# Patient Record
Sex: MALE | Race: WHITE | Employment: FULL TIME | URBAN - METROPOLITAN AREA
[De-identification: names, ages, dates, MRNs, and addresses within clinical notes are randomized per-mention and may not be internally consistent; named-entity substitution may affect disease eponyms.]

---

## 2022-03-18 PROBLEM — Z12.5 PROSTATE CANCER SCREENING: Status: ACTIVE | Noted: 2021-07-28

## 2022-03-18 PROBLEM — E66.3 OVERWEIGHT (BMI 25.0-29.9): Status: ACTIVE | Noted: 2021-06-29

## 2022-03-18 PROBLEM — Z12.11 COLON CANCER SCREENING: Status: ACTIVE | Noted: 2021-07-28

## 2022-03-18 PROBLEM — E78.5 HYPERLIPIDEMIA: Status: ACTIVE | Noted: 2021-06-29

## 2022-03-18 PROBLEM — N41.0 ACUTE PROSTATITIS: Status: ACTIVE | Noted: 2021-11-04

## 2022-03-19 PROBLEM — R53.83 FATIGUE: Status: ACTIVE | Noted: 2021-06-29

## 2022-03-20 PROBLEM — N40.1 BENIGN PROSTATIC HYPERPLASIA WITH NOCTURIA: Status: ACTIVE | Noted: 2021-11-04

## 2022-03-20 PROBLEM — R55 NEAR SYNCOPE: Status: ACTIVE | Noted: 2021-06-29

## 2022-03-20 PROBLEM — Z95.1 HX OF CABG: Status: ACTIVE | Noted: 2021-06-29

## 2022-03-20 PROBLEM — R35.1 BENIGN PROSTATIC HYPERPLASIA WITH NOCTURIA: Status: ACTIVE | Noted: 2021-11-04

## 2022-03-20 PROBLEM — Z23 ENCOUNTER FOR IMMUNIZATION: Status: ACTIVE | Noted: 2021-07-28

## 2022-05-13 PROBLEM — I10 HYPERTENSION: Status: ACTIVE | Noted: 2022-05-13

## 2022-05-13 PROBLEM — R06.09 CHRONIC DYSPNEA: Status: ACTIVE | Noted: 2022-05-13

## 2022-06-21 ENCOUNTER — TELEPHONE (OUTPATIENT)
Dept: CARDIOLOGY CLINIC | Age: 62
End: 2022-06-21

## 2022-06-21 NOTE — TELEPHONE ENCOUNTER
Triage informed pt of Dr. Mayte Ortega response to his echocardiogram results. Pt verbalizes understanding.

## 2022-06-21 NOTE — TELEPHONE ENCOUNTER
----- Message from Osman Grajeda MD sent at 6/20/2022  5:34 PM EDT -----  Please let the patient know that the heart function is normal on ECHO.

## 2022-08-01 NOTE — PROGRESS NOTES
PLEASE CONTINUE TAKING ALL PRESCRIPTION MEDICATIONS UP TO THE DAY OF SURGERY UNLESS OTHERWISE DIRECTED BELOW. DISCONTINUE all vitamins and supplements 7 days prior to surgery. DISCONTINUE Non-Steriodal Anti-Inflammatory (NSAIDS) such as Advil and Aleve 5 days prior to surgery. Home Medications to take  the day of surgery    Asa 81 mg           Home Medications   to Hold   none        Comments       On the day before surgery please take Acetaminophen 1000mg in the morning and then again before bed. You may substitute for Tylenol 650 mg. Please do not bring home medications with you on the day of surgery unless otherwise directed by your nurse. If you are instructed to bring home medications, please give them to your nurse as they will be administered by the nursing staff. If you have any questions, please call [address] [place] [address] [phone] or  [facility] [phone]. A copy of this note was provided to the patient for reference.   7

## 2022-08-04 ENCOUNTER — ANESTHESIA (OUTPATIENT)
Dept: SURGERY | Age: 62
End: 2022-08-04
Payer: COMMERCIAL

## 2022-08-04 ENCOUNTER — HOSPITAL ENCOUNTER (OUTPATIENT)
Age: 62
Setting detail: OUTPATIENT SURGERY
Discharge: HOME | End: 2022-08-04
Attending: OPHTHALMOLOGY | Admitting: OPHTHALMOLOGY
Payer: COMMERCIAL

## 2022-08-04 ENCOUNTER — ANESTHESIA EVENT (OUTPATIENT)
Dept: SURGERY | Age: 62
End: 2022-08-04
Payer: COMMERCIAL

## 2022-08-04 ENCOUNTER — PREP FOR PROCEDURE (OUTPATIENT)
Dept: ADMINISTRATIVE | Age: 62
End: 2022-08-04

## 2022-08-04 ENCOUNTER — PREP FOR PROCEDURE (OUTPATIENT)
Dept: OPHTHALMOLOGY | Age: 62
End: 2022-08-04

## 2022-08-04 VITALS
SYSTOLIC BLOOD PRESSURE: 125 MMHG | HEART RATE: 71 BPM | DIASTOLIC BLOOD PRESSURE: 73 MMHG | HEIGHT: 67 IN | BODY MASS INDEX: 25.74 KG/M2 | TEMPERATURE: 97.1 F | WEIGHT: 164 LBS | OXYGEN SATURATION: 96 % | RESPIRATION RATE: 16 BRPM

## 2022-08-04 DIAGNOSIS — G89.18 POST-OP PAIN: Primary | ICD-10-CM

## 2022-08-04 LAB
GLUCOSE BLD STRIP.AUTO-MCNC: 123 MG/DL (ref 65–100)
SERVICE CMNT-IMP: ABNORMAL

## 2022-08-04 PROCEDURE — 3700000000 HC ANESTHESIA ATTENDED CARE: Performed by: OPHTHALMOLOGY

## 2022-08-04 PROCEDURE — 3700000001 HC ADD 15 MINUTES (ANESTHESIA): Performed by: OPHTHALMOLOGY

## 2022-08-04 PROCEDURE — 6370000000 HC RX 637 (ALT 250 FOR IP): Performed by: OPHTHALMOLOGY

## 2022-08-04 PROCEDURE — 7100000011 HC PHASE II RECOVERY - ADDTL 15 MIN: Performed by: OPHTHALMOLOGY

## 2022-08-04 PROCEDURE — 7100000010 HC PHASE II RECOVERY - FIRST 15 MIN: Performed by: OPHTHALMOLOGY

## 2022-08-04 PROCEDURE — 2500000003 HC RX 250 WO HCPCS: Performed by: NURSE ANESTHETIST, CERTIFIED REGISTERED

## 2022-08-04 PROCEDURE — 2709999900 HC NON-CHARGEABLE SUPPLY: Performed by: OPHTHALMOLOGY

## 2022-08-04 PROCEDURE — 2720000010 HC SURG SUPPLY STERILE: Performed by: OPHTHALMOLOGY

## 2022-08-04 PROCEDURE — 2500000003 HC RX 250 WO HCPCS: Performed by: OPHTHALMOLOGY

## 2022-08-04 PROCEDURE — 3600000004 HC SURGERY LEVEL 4 BASE: Performed by: OPHTHALMOLOGY

## 2022-08-04 PROCEDURE — C1784 OCULAR DEV, INTRAOP, DET RET: HCPCS | Performed by: OPHTHALMOLOGY

## 2022-08-04 PROCEDURE — 7100000001 HC PACU RECOVERY - ADDTL 15 MIN: Performed by: OPHTHALMOLOGY

## 2022-08-04 PROCEDURE — 2580000003 HC RX 258: Performed by: ANESTHESIOLOGY

## 2022-08-04 PROCEDURE — 6360000002 HC RX W HCPCS: Performed by: NURSE ANESTHETIST, CERTIFIED REGISTERED

## 2022-08-04 PROCEDURE — 6370000000 HC RX 637 (ALT 250 FOR IP): Performed by: ANESTHESIOLOGY

## 2022-08-04 PROCEDURE — 3600000014 HC SURGERY LEVEL 4 ADDTL 15MIN: Performed by: OPHTHALMOLOGY

## 2022-08-04 PROCEDURE — C1713 ANCHOR/SCREW BN/BN,TIS/BN: HCPCS | Performed by: OPHTHALMOLOGY

## 2022-08-04 PROCEDURE — 2580000003 HC RX 258: Performed by: OPHTHALMOLOGY

## 2022-08-04 PROCEDURE — 6360000002 HC RX W HCPCS: Performed by: OPHTHALMOLOGY

## 2022-08-04 PROCEDURE — 7100000000 HC PACU RECOVERY - FIRST 15 MIN: Performed by: OPHTHALMOLOGY

## 2022-08-04 PROCEDURE — 82962 GLUCOSE BLOOD TEST: CPT

## 2022-08-04 DEVICE — SLEEVE SCLER BCKL L30IN OD21IN ID1IN SIL STYL 70: Type: IMPLANTABLE DEVICE | Site: EYE | Status: FUNCTIONAL

## 2022-08-04 DEVICE — STRIP SCLER W3.5XL125MM THK0.75MM SIL SLD STYL 41/S2970: Type: IMPLANTABLE DEVICE | Site: EYE | Status: FUNCTIONAL

## 2022-08-04 RX ORDER — SODIUM CHLORIDE 0.9 % (FLUSH) 0.9 %
5-40 SYRINGE (ML) INJECTION EVERY 12 HOURS SCHEDULED
Status: DISCONTINUED | OUTPATIENT
Start: 2022-08-04 | End: 2022-08-04 | Stop reason: HOSPADM

## 2022-08-04 RX ORDER — EPHEDRINE SULFATE/0.9% NACL/PF 50 MG/5 ML
SYRINGE (ML) INTRAVENOUS PRN
Status: DISCONTINUED | OUTPATIENT
Start: 2022-08-04 | End: 2022-08-04 | Stop reason: SDUPTHER

## 2022-08-04 RX ORDER — SODIUM CHLORIDE, POTASSIUM CHLORIDE, CALCIUM CHLORIDE, MAGNESIUM CHLORIDE, SODIUM ACETATE, AND SODIUM CITRATE 6.4; .75; .48; .3; 3.9; 1.7 MG/ML; MG/ML; MG/ML; MG/ML; MG/ML; MG/ML
SOLUTION IRRIGATION PRN
Status: DISCONTINUED | OUTPATIENT
Start: 2022-08-04 | End: 2022-08-04 | Stop reason: ALTCHOICE

## 2022-08-04 RX ORDER — SODIUM CHLORIDE, SODIUM LACTATE, POTASSIUM CHLORIDE, CALCIUM CHLORIDE 600; 310; 30; 20 MG/100ML; MG/100ML; MG/100ML; MG/100ML
INJECTION, SOLUTION INTRAVENOUS CONTINUOUS
Status: DISCONTINUED | OUTPATIENT
Start: 2022-08-04 | End: 2022-08-04 | Stop reason: HOSPADM

## 2022-08-04 RX ORDER — PHENYLEPHRINE HCL 2.5 %
1 DROPS OPHTHALMIC (EYE)
Status: CANCELLED | OUTPATIENT
Start: 2022-08-04 | End: 2022-08-04

## 2022-08-04 RX ORDER — HYDROMORPHONE HYDROCHLORIDE 2 MG/ML
0.25 INJECTION, SOLUTION INTRAMUSCULAR; INTRAVENOUS; SUBCUTANEOUS EVERY 5 MIN PRN
Status: DISCONTINUED | OUTPATIENT
Start: 2022-08-04 | End: 2022-08-04 | Stop reason: HOSPADM

## 2022-08-04 RX ORDER — HYDROCODONE BITARTRATE AND ACETAMINOPHEN 7.5; 325 MG/1; MG/1
1 TABLET ORAL EVERY 6 HOURS PRN
Qty: 20 TABLET | Refills: 0 | OUTPATIENT
Start: 2022-08-04 | End: 2022-08-09

## 2022-08-04 RX ORDER — LIDOCAINE HYDROCHLORIDE 10 MG/ML
1 INJECTION, SOLUTION INFILTRATION; PERINEURAL
Status: DISCONTINUED | OUTPATIENT
Start: 2022-08-04 | End: 2022-08-04 | Stop reason: HOSPADM

## 2022-08-04 RX ORDER — BUPIVACAINE HYDROCHLORIDE 5 MG/ML
INJECTION, SOLUTION EPIDURAL; INTRACAUDAL PRN
Status: DISCONTINUED | OUTPATIENT
Start: 2022-08-04 | End: 2022-08-04 | Stop reason: ALTCHOICE

## 2022-08-04 RX ORDER — PHENYLEPHRINE HCL 2.5 %
1 DROPS OPHTHALMIC (EYE)
Status: COMPLETED | OUTPATIENT
Start: 2022-08-04 | End: 2022-08-04

## 2022-08-04 RX ORDER — CEFAZOLIN SODIUM 1 G/3ML
INJECTION, POWDER, FOR SOLUTION INTRAMUSCULAR; INTRAVENOUS PRN
Status: DISCONTINUED | OUTPATIENT
Start: 2022-08-04 | End: 2022-08-04 | Stop reason: ALTCHOICE

## 2022-08-04 RX ORDER — DEXTROSE MONOHYDRATE 100 MG/ML
INJECTION, SOLUTION INTRAVENOUS CONTINUOUS PRN
Status: COMPLETED | OUTPATIENT
Start: 2022-08-04 | End: 2022-08-04

## 2022-08-04 RX ORDER — NEOMYCIN SULFATE, POLYMYXIN B SULFATE, AND DEXAMETHASONE 3.5; 10000; 1 MG/G; [USP'U]/G; MG/G
OINTMENT OPHTHALMIC PRN
Status: DISCONTINUED | OUTPATIENT
Start: 2022-08-04 | End: 2022-08-04 | Stop reason: ALTCHOICE

## 2022-08-04 RX ORDER — PROPOFOL 10 MG/ML
INJECTION, EMULSION INTRAVENOUS PRN
Status: DISCONTINUED | OUTPATIENT
Start: 2022-08-04 | End: 2022-08-04 | Stop reason: SDUPTHER

## 2022-08-04 RX ORDER — LIDOCAINE HYDROCHLORIDE 20 MG/ML
INJECTION, SOLUTION EPIDURAL; INFILTRATION; INTRACAUDAL; PERINEURAL PRN
Status: DISCONTINUED | OUTPATIENT
Start: 2022-08-04 | End: 2022-08-04 | Stop reason: SDUPTHER

## 2022-08-04 RX ORDER — OXYCODONE HYDROCHLORIDE 5 MG/1
5 TABLET ORAL PRN
Status: DISCONTINUED | OUTPATIENT
Start: 2022-08-04 | End: 2022-08-04 | Stop reason: HOSPADM

## 2022-08-04 RX ORDER — BETAMETHASONE SODIUM PHOSPHATE AND BETAMETHASONE ACETATE 3; 3 MG/ML; MG/ML
INJECTION, SUSPENSION INTRA-ARTICULAR; INTRALESIONAL; INTRAMUSCULAR; SOFT TISSUE PRN
Status: DISCONTINUED | OUTPATIENT
Start: 2022-08-04 | End: 2022-08-04 | Stop reason: ALTCHOICE

## 2022-08-04 RX ORDER — SODIUM CHLORIDE 0.9 % (FLUSH) 0.9 %
5-40 SYRINGE (ML) INJECTION PRN
Status: DISCONTINUED | OUTPATIENT
Start: 2022-08-04 | End: 2022-08-04 | Stop reason: HOSPADM

## 2022-08-04 RX ORDER — LIDOCAINE HYDROCHLORIDE 20 MG/ML
INJECTION, SOLUTION EPIDURAL; INFILTRATION; INTRACAUDAL; PERINEURAL PRN
Status: DISCONTINUED | OUTPATIENT
Start: 2022-08-04 | End: 2022-08-04 | Stop reason: ALTCHOICE

## 2022-08-04 RX ORDER — CYCLOPENTOLATE HYDROCHLORIDE 20 MG/ML
1 SOLUTION/ DROPS OPHTHALMIC
Status: COMPLETED | OUTPATIENT
Start: 2022-08-04 | End: 2022-08-04

## 2022-08-04 RX ORDER — HYDROMORPHONE HYDROCHLORIDE 2 MG/ML
0.5 INJECTION, SOLUTION INTRAMUSCULAR; INTRAVENOUS; SUBCUTANEOUS EVERY 5 MIN PRN
Status: DISCONTINUED | OUTPATIENT
Start: 2022-08-04 | End: 2022-08-04 | Stop reason: HOSPADM

## 2022-08-04 RX ORDER — ONDANSETRON 2 MG/ML
INJECTION INTRAMUSCULAR; INTRAVENOUS PRN
Status: DISCONTINUED | OUTPATIENT
Start: 2022-08-04 | End: 2022-08-04 | Stop reason: SDUPTHER

## 2022-08-04 RX ORDER — ACETAMINOPHEN 500 MG
1000 TABLET ORAL ONCE
Status: COMPLETED | OUTPATIENT
Start: 2022-08-04 | End: 2022-08-04

## 2022-08-04 RX ORDER — SODIUM CHLORIDE 9 MG/ML
INJECTION, SOLUTION INTRAVENOUS PRN
Status: DISCONTINUED | OUTPATIENT
Start: 2022-08-04 | End: 2022-08-04 | Stop reason: HOSPADM

## 2022-08-04 RX ORDER — MIDAZOLAM HYDROCHLORIDE 2 MG/2ML
2 INJECTION, SOLUTION INTRAMUSCULAR; INTRAVENOUS
Status: DISCONTINUED | OUTPATIENT
Start: 2022-08-04 | End: 2022-08-04 | Stop reason: HOSPADM

## 2022-08-04 RX ORDER — ONDANSETRON 2 MG/ML
4 INJECTION INTRAMUSCULAR; INTRAVENOUS
Status: DISCONTINUED | OUTPATIENT
Start: 2022-08-04 | End: 2022-08-04 | Stop reason: HOSPADM

## 2022-08-04 RX ORDER — DIPHENHYDRAMINE HYDROCHLORIDE 50 MG/ML
12.5 INJECTION INTRAMUSCULAR; INTRAVENOUS
Status: DISCONTINUED | OUTPATIENT
Start: 2022-08-04 | End: 2022-08-04 | Stop reason: HOSPADM

## 2022-08-04 RX ORDER — GENTAMICIN SULFATE 3 MG/ML
SOLUTION/ DROPS OPHTHALMIC PRN
Status: DISCONTINUED | OUTPATIENT
Start: 2022-08-04 | End: 2022-08-04 | Stop reason: ALTCHOICE

## 2022-08-04 RX ORDER — DEXAMETHASONE SODIUM PHOSPHATE 10 MG/ML
INJECTION INTRAMUSCULAR; INTRAVENOUS PRN
Status: DISCONTINUED | OUTPATIENT
Start: 2022-08-04 | End: 2022-08-04 | Stop reason: SDUPTHER

## 2022-08-04 RX ORDER — OXYCODONE HYDROCHLORIDE 5 MG/1
10 TABLET ORAL PRN
Status: DISCONTINUED | OUTPATIENT
Start: 2022-08-04 | End: 2022-08-04 | Stop reason: HOSPADM

## 2022-08-04 RX ORDER — PROCHLORPERAZINE EDISYLATE 5 MG/ML
5 INJECTION INTRAMUSCULAR; INTRAVENOUS
Status: DISCONTINUED | OUTPATIENT
Start: 2022-08-04 | End: 2022-08-04 | Stop reason: HOSPADM

## 2022-08-04 RX ORDER — CYCLOPENTOLATE HYDROCHLORIDE 20 MG/ML
1 SOLUTION/ DROPS OPHTHALMIC
Status: CANCELLED | OUTPATIENT
Start: 2022-08-04 | End: 2022-08-04

## 2022-08-04 RX ADMIN — DEXAMETHASONE SODIUM PHOSPHATE 10 MG: 10 INJECTION INTRAMUSCULAR; INTRAVENOUS at 13:40

## 2022-08-04 RX ADMIN — FENTANYL CITRATE 50 MCG: 50 INJECTION INTRAMUSCULAR; INTRAVENOUS at 14:24

## 2022-08-04 RX ADMIN — Medication 15 MG: at 13:05

## 2022-08-04 RX ADMIN — Medication 10 MG: at 13:54

## 2022-08-04 RX ADMIN — CYCLOPENTOLATE HYDROCHLORIDE 1 DROP: 20 SOLUTION/ DROPS OPHTHALMIC at 12:33

## 2022-08-04 RX ADMIN — Medication 15 MG: at 13:27

## 2022-08-04 RX ADMIN — LIDOCAINE HYDROCHLORIDE 100 MG: 20 INJECTION, SOLUTION EPIDURAL; INFILTRATION; INTRACAUDAL; PERINEURAL at 13:05

## 2022-08-04 RX ADMIN — ONDANSETRON 4 MG: 2 INJECTION INTRAMUSCULAR; INTRAVENOUS at 13:41

## 2022-08-04 RX ADMIN — FENTANYL CITRATE 50 MCG: 50 INJECTION INTRAMUSCULAR; INTRAVENOUS at 13:16

## 2022-08-04 RX ADMIN — CYCLOPENTOLATE HYDROCHLORIDE 1 DROP: 20 SOLUTION/ DROPS OPHTHALMIC at 12:21

## 2022-08-04 RX ADMIN — SODIUM CHLORIDE, POTASSIUM CHLORIDE, SODIUM LACTATE AND CALCIUM CHLORIDE: 600; 310; 30; 20 INJECTION, SOLUTION INTRAVENOUS at 12:35

## 2022-08-04 RX ADMIN — PHENYLEPHRINE HYDROCHLORIDE 1 DROP: 25 SOLUTION/ DROPS OPHTHALMIC at 12:22

## 2022-08-04 RX ADMIN — PHENYLEPHRINE HYDROCHLORIDE 1 DROP: 25 SOLUTION/ DROPS OPHTHALMIC at 12:33

## 2022-08-04 RX ADMIN — CYCLOPENTOLATE HYDROCHLORIDE 1 DROP: 20 SOLUTION/ DROPS OPHTHALMIC at 12:28

## 2022-08-04 RX ADMIN — ACETAMINOPHEN 1000 MG: 500 TABLET, FILM COATED ORAL at 12:28

## 2022-08-04 RX ADMIN — PHENYLEPHRINE HYDROCHLORIDE 1 DROP: 25 SOLUTION/ DROPS OPHTHALMIC at 12:28

## 2022-08-04 RX ADMIN — PROPOFOL 200 MG: 10 INJECTION, EMULSION INTRAVENOUS at 13:05

## 2022-08-04 ASSESSMENT — PAIN - FUNCTIONAL ASSESSMENT: PAIN_FUNCTIONAL_ASSESSMENT: 0-10

## 2022-08-04 NOTE — DISCHARGE INSTRUCTIONS
MD Ni Schneider MD Forest Murray. MD Emma Morgan. Óscar Barr MD    [phone] or [phone] or [phone] or [phone]    Post Operative Instructions for Retina and Vitreous Surgery Patients    The following are a few guidelines that you should observe for two weeks after surgery:    1. Avoid stooping over, lifting heavy weights or bumping your head. 2.  Special positioning:EITHER SIDE LOOKING DOWN     3. No extensive traveling except what is necessary. If a gas air bubble was put in your      eye at surgery - avoid air travel until you consult your doctor. 4.  You may watch television, read, cook and wash dishes as long as it does not interfere        with special positioning instructions. 5.  No strenuous activity or sexual intercourse. 6.  Some discharge from the operated eye is to be expected. This should gradually        improve. 7.  Wear the eye shield or glasses at all times until cleared by the doctor. 8.  If you experience increasing pain, decreasing vision, or pus like discharge, call the      Office. 9. BRING ALL EYE DROPS TO YOU POSTOPERATIVE APPOINTMENT! 10. Return appointment at the [facility]        On 8/5/22 @ 9:25am    Medication Interaction:  During your procedure you potentially received a medication or medications which may reduce the effectiveness of oral contraceptives. Please consider other forms of contraception for 1 month following your procedure if you are currently using oral contraceptives as your primary form of birth control. In addition to this, we recommend continuing your oral contraceptive as prescribed, unless otherwise instructed by your physician, during this time.     After general anesthesia or intravenous sedation, for 24 hours or while taking prescription Narcotics:  Limit your activities  A responsible adult needs to be with you for the next 24 hours  Do not drive and operate hazardous machinery  Do not make important personal or business decisions  Do not drink alcoholic beverages  If you have not urinated within 8 hours after discharge, and you are experiencing discomfort from urinary retention, please go to the nearest ED. If you have sleep apnea and have a CPAP machine, please use it for all naps and sleeping. Please use caution when taking narcotics and any of your home medications that may cause drowsiness. *  Please give a list of your current medications to your Primary Care Provider. *  Please update this list whenever your medications are discontinued, doses are      changed, or new medications (including over-the-counter products) are added. *  Please carry medication information at all times in case of emergency situations. These are general instructions for a healthy lifestyle:  No smoking/ No tobacco products/ Avoid exposure to second hand smoke  Surgeon General's Warning:  Quitting smoking now greatly reduces serious risk to your health. Obesity, smoking, and sedentary lifestyle greatly increases your risk for illness  A healthy diet, regular physical exercise & weight monitoring are important for maintaining a healthy lifestyle    You may be retaining fluid if you have a history of heart failure or if you experience any of the following symptoms:  Weight gain of 3 pounds or more overnight or 5 pounds in a week, increased swelling in our hands or feet or shortness of breath while lying flat in bed. Please call your doctor as soon as you notice any of these symptoms; do not wait until your next office visit.

## 2022-08-04 NOTE — NURSE NAVIGATOR
Pt received from OR on NC. Tolerating RA> wife at bedside. discharge instructions reviewed and questions answered. Vss. No further needs at this time.

## 2022-08-04 NOTE — ANESTHESIA PROCEDURE NOTES
Airway  Date/Time: 8/4/2022 1:07 PM  Urgency: elective    Airway not difficult    General Information and Staff    Patient location during procedure: OR  Resident/CRNA: DIAN Warren - CRNA  Performed: resident/CRNA     Indications and Patient Condition  Indications for airway management: anesthesia  Spontaneous ventilation: present  Sedation level: deep  Preoxygenated: yes  Patient position: sniffing  MILS not maintained throughout  Mask difficulty assessment: vent by bag mask    Final Airway Details  Final airway type: supraglottic airway      Successful airway: oropharyngeal  Size 5     Number of attempts at approach: 1  Ventilation between attempts: supraglottic airway  Number of other approaches attempted: 0    Additional Comments  #5 LMA with leak noted at 20 cmH2O. BBS=/ETCO2+/dentition and lips as preop.   LMA inserted by Latisha WEST  no

## 2022-08-04 NOTE — ANESTHESIA PRE PROCEDURE
Department of Anesthesiology  Preprocedure Note       Name:  Pedro Servin   Age:  59 y.o.  :  1960                                          MRN:  [id number]         Date:  2022      Surgeon: Mariya Marroquin):  Dinesh Rivera MD    Procedure: Procedure(s):  RIGHT EYE VITRECTOMY 25 GAUGE LASER, SCAR TISSUE REMOVAL POSSIBLE GAS FLUID EXCHANGE VS SILICON OIL PLACEMENT WITH SCLERAL  BUCKEL PLACEMENT  PROPHYLAXIS LASER OF LEFT EYE    Medications prior to admission:   Prior to Admission medications    Medication Sig Start Date End Date Taking?  Authorizing Provider   aspirin 81 MG EC tablet Take by mouth daily    Ar Automatic Reconciliation   atorvastatin (LIPITOR) 80 MG tablet Take 80 mg by mouth at bedtime    Ar Automatic Reconciliation   empagliflozin (JARDIANCE) 10 MG tablet Take by mouth daily    Ar Automatic Reconciliation       Current medications:    Current Facility-Administered Medications   Medication Dose Route Frequency Provider Last Rate Last Admin    lidocaine 1 % injection 1 mL  1 mL IntraDERmal Once PRN Elliot Mitchell IV, MD        lactated ringers infusion   IntraVENous Continuous Elliot Mitchell IV,  mL/hr at 22 1235 Restarted at 22 1258    sodium chloride flush 0.9 % injection 5-40 mL  5-40 mL IntraVENous 2 times per day Elliot Mitchell IV, MD        sodium chloride flush 0.9 % injection 5-40 mL  5-40 mL IntraVENous PRN Elliot Mitchell IV, MD        0.9 % sodium chloride infusion   IntraVENous PRN Elliot Mitchell IV, MD        midazolam PF (VERSED) injection 2 mg  2 mg IntraVENous Once PRN Elliot Mitchell IV, MD        balanced salts (BSS) ophthalmic solution    PRN Dinesh Rivera MD   15 mL at 22 1338    betamethasone acetate-betamethasone sodium phosphate (CELESTONE) injection    PRN Dinesh Rivera MD   3 mg at 22 1338    bupivacaine (PF) (MARCAINE) 0.5 % injection    PRN Dinesh Rivera MD   3 mL at 22 1338    ceFAZolin (ANCEF) injection    PRN Sam Melton Bryan Ackerman MD   50 mg at 08/04/22 1339    dextrose 10 % infusion    Continuous PRN Doris Hoffman MD   15 mL at 08/04/22 1339    gentamicin (GARAMYCIN) 0.3 % ophthalmic solution    PRN Doris Hoffman MD   2 drop at 08/04/22 1339    hyaluronidase human (HYLENEX) injection    PRN Doris Hoffman MD   0.5 mL at 08/04/22 1340    lidocaine PF 2 % injection    PRN Doris Hoffman MD   3 mL at 08/04/22 1341    neomycin-polymyxin-dexameth ophthalmic ointment    PRN Doris Hoffman MD   1,000 mg at 08/04/22 1341    brilliant blue g (TISSUEBLUE) 0.025 % intra-ocular injection    PRN Doris Hoffman MD   0.5 mL at 08/04/22 1355     Facility-Administered Medications Ordered in Other Encounters   Medication Dose Route Frequency Provider Last Rate Last Admin    fentaNYL (SUBLIMAZE) injection   IntraVENous PRN Belen Hu, APRN - CRNA   50 mcg at 08/04/22 1316    propofol injection   IntraVENous PRN Belen Hu, APRN - CRNA   200 mg at 08/04/22 1305    lidocaine PF 2 % injection   IntraVENous PRN Belen Hu, APRN - CRNA   100 mg at 08/04/22 1305    ePHEDrine injection   IntraVENous PRN Belen Hu, APRN - CRNA   10 mg at 08/04/22 1354    dexamethasone (DECADRON) injection   IntraVENous PRN Belen Hu, APRN - CRNA   10 mg at 08/04/22 1340    ondansetron (ZOFRAN) injection   IntraVENous PRN Belen Hu, APRN - CRNA   4 mg at 08/04/22 1341       Allergies:  No Known Allergies    Problem List:    Patient Active Problem List   Diagnosis Code    Acute prostatitis N41.0    Hyperlipidemia E78.5    Osteoarthritis M19.90    Colon cancer screening Z12.11    Overweight (BMI 25.0-29. 9) E66.3    Prostate cancer screening Z12.5    Fatigue R53.83    Diabetes mellitus, type 2 (HCC) E11.9    Hyperlipidemia associated with type 2 diabetes mellitus ([facility] E11.69, E78.5    Encounter for immunization Z23    Coronary artery disease I25.10    Near syncope R55    Hx of CABG Z95.1    Benign prostatic hyperplasia with nocturia N40.1, R35.1    Chronic dyspnea R06.09    Hypertension I10       Past Medical History:        Diagnosis Date    Coronary artery disease     S/P 3 v CABG in 2019.  --NO STENTS-- followed by dr Joya Chao [place] Diabetes mellitus, type 2 ([facility]     type2-- sqbs am avg 125--- hypo at 66's    Hyperlipidemia associated with type 2 diabetes mellitus ([facility]     Osteoarthritis        Past Surgical History:        Procedure Laterality Date    APPENDECTOMY      CORONARY ARTERY BYPASS GRAFT  2019    HERNIA REPAIR Left     inguinal       Social History:    Social History     Tobacco Use    Smoking status: Never    Smokeless tobacco: Never   Substance Use Topics    Alcohol use: Yes     Comment: occ                                Counseling given: Not Answered      Vital Signs (Current):   Vitals:    08/01/22 1531 08/04/22 1232   BP:  132/70   Pulse:  66   Resp:  18   Temp:  98.4 °F (36.9 °C)   TempSrc:  Oral   SpO2:  96%   Weight: 164 lb (74.4 kg) 164 lb (74.4 kg)   Height: 5' 7\" (1.702 m)                                               BP Readings from Last 3 Encounters:   08/04/22 132/70   05/13/22 130/84   05/06/22 120/76       NPO Status: Time of last liquid consumption: 2300                        Time of last solid consumption: 2300                        Date of last liquid consumption: 08/03/22                        Date of last solid food consumption: 08/03/22    BMI:   Wt Readings from Last 3 Encounters:   08/04/22 164 lb (74.4 kg)   06/20/22 165 lb (74.8 kg)   05/13/22 165 lb (74.8 kg)     Body mass index is 25.69 kg/m².     CBC:   Lab Results   Component Value Date/Time    WBC 5.3 07/28/2021 02:30 PM    RBC 4.58 07/28/2021 02:30 PM    HGB 14.7 07/28/2021 02:30 PM    HCT 44.3 07/28/2021 02:30 PM    MCV 97 07/28/2021 02:30 PM    RDW 13.0 07/28/2021 02:30 PM     07/28/2021 02:30 PM       CMP:   Lab Results   Component Value Date/Time     11/05/2021 09:17 AM    K 5.1 11/05/2021 09:17 AM    CL 97 11/05/2021 09:17 AM    CO2 20 11/05/2021 09:17 AM    BUN 23 11/05/2021 09:17 AM    CREATININE 1.25 11/05/2021 09:17 AM    GFRAA 70 11/05/2021 09:17 AM    AGRATIO 1.8 11/05/2021 09:17 AM    GLUCOSE 109 11/05/2021 09:17 AM    PROT 6.6 11/05/2021 09:17 AM    CALCIUM 9.2 11/05/2021 09:17 AM    BILITOT 0.6 11/05/2021 09:17 AM    ALKPHOS 95 11/05/2021 09:17 AM    AST 21 11/05/2021 09:17 AM    ALT 19 11/05/2021 09:17 AM       POC Tests:   Recent Labs     08/04/22  1229   POCGLU 123*       Coags: No results found for: PROTIME, INR, APTT    HCG (If Applicable): No results found for: PREGTESTUR, PREGSERUM, HCG, HCGQUANT     ABGs: No results found for: PHART, PO2ART, NYK7KRC, UTE4YSW, BEART, Q2KSJFUZ     Type & Screen (If Applicable):  No results found for: LABABO, LABRH    Drug/Infectious Status (If Applicable):  No results found for: HIV, HEPCAB    COVID-19 Screening (If Applicable): No results found for: COVID19        Anesthesia Evaluation  Patient summary reviewed and Nursing notes reviewed  Airway: Mallampati: I  TM distance: >3 FB   Neck ROM: full  Mouth opening: > = 3 FB   Dental: normal exam         Pulmonary: breath sounds clear to auscultation                             Cardiovascular:  Exercise tolerance: good (>4 METS),   (+) hypertension:, CAD:, CABG/stent:,         Rhythm: regular  Rate: normal                    Neuro/Psych:   Negative Neuro/Psych ROS              GI/Hepatic/Renal:             Endo/Other:    (+) DiabetesType II DM, well controlled, , .                 Abdominal:             Vascular: negative vascular ROS. Other Findings:           Anesthesia Plan      general     ASA 3       Induction: intravenous. Anesthetic plan and risks discussed with patient and spouse.                         Candelaria Hylton MD   8/4/2022

## 2022-08-04 NOTE — BRIEF OP NOTE
Brief Postoperative Note      Patient: Jarvis Cronin  YOB: 1960  MRN: [id number]    Date of Procedure: 8/4/2022    Pre-Op Diagnosis: Total retinal detachment of right eye [H33.051]    Post-Op Diagnosis: Same       Procedure(s):  RIGHT EYE VITRECTOMY 25 GAUGE LASER, SCAR TISSUE REMOVAL POSSIBLE GAS FLUID EXCHANGE VS SILICON OIL PLACEMENT WITH SCLERAL  BUCKEL PLACEMENT  PROPHYLAXIS LASER OF LEFT EYE    Surgeon(s):  Bhavya Blount MD    Assistant:  * No surgical staff found *    Anesthesia: General    Estimated Blood Loss (mL): Minimal    Complications: None    Specimens:   * No specimens in log *    Implants:  Implant Name Type Inv.  Item Serial No.  Lot No. LRB No. Used Action   STRIP SCLER [id number] THK0.75MM [employer] SLD STYL 41/ - [id number]  STRIP SCLER [id number] THK0.75MM [employer] SLD STYL 41/  [facility] [employer] [employer] [id number] Right 1 Implanted   SLEEVE SCLER BCKL L30IN OD21IN ID1IN [employer]MER STYL 70 - [id number]  SLEEVE SCLER BCKL L30IN OD21IN ID1IN [employer]YL [id number] [place] [employer] [id number] Right 1 Implanted         Drains: * No LDAs found *    Findings: 0    Electronically signed by Trish Cazares MD on 8/4/2022 at 3:06 PM

## 2022-08-05 NOTE — OP NOTE
[address] [place] [address]  OPERATIVE REPORT    Name:  Javier Watson  MR#:  [id number]  :  1960  ACCOUNT #:  [de-identified]  DATE OF SERVICE:  2022    PREOPERATIVE DIAGNOSES:  1. Rhegmatogenous retinal detachment, macula-off, chronic with proliferative vitreoretinopathy, right eye. Multiple breaks. 2.  Nuclear sclerosis, right eye. 3.  Lattice degeneration, left eye. POSTOPERATIVE DIAGNOSES:  1. Rhegmatogenous retinal detachment, macula-off, chronic with proliferative vitreoretinopathy, right eye. Multiple breaks. 2.  Nuclear sclerosis, right eye. 3.  Lattice degeneration, left eye. PROCEDURE PERFORMED:  1.  Pars plana vitrectomy with repair of complex retinal detachment utilizing vitrectomy, scar tissue removal oblique, scleral buckle with 41 band and 70 sleeve. and fluid-gas exchange to the right eye. 2.  Laser prophylaxis to the left eye. SURGEON:  Patricio Briggs MD    ASSISTANT:  No assistants. ANESTHESIA:  Modified. COMPLICATIONS:  No complications. SPECIMENS REMOVED:  No specimens. IMPLANTS:  He had a 41 band with 70 sleeve. ESTIMATED BLOOD LOSS:  No blood loss. INDICATIONS FOR PROCEDURE:  The patient has significant severe vision loss for approximately one month and had previous retinal tears with multiple breaks, two large superonasal breaks and a smaller inferotemporal break with rolled edges and fairly stiff-appearing corrugated retina. He had lattice degeneration present in his fellow eye. After discussing his options, risks, and benefits, it was recommended to undergo vitrectomy with scleral buckling and possible silicone oil or other long-term tamponade in the right eye combined with laser prophylaxis in the left eye. The patient understood the risks, benefits, and alternatives to the surgery, especially the risk of recurrent detachment with PVR already present and agreed to proceed.     PROCEDURE:  After adequate preoperative evaluation and informed consent had been obtained, the patient was brought into the operative suite. IV access and general laryngotracheal mask anesthetic was achieved. The left eye was inspected with indirect ophthalmoscopy and areas of lattice degeneration were noted and treated with indirect photocoagulation, and a total of 189 spots were used. Attention was then turned to the right eye. The left eye was protected with ointment and a shield, and the right eye was inspected and found to have total retinal detachments 0 two large superonasal tears and a small inferotemporal tear and a fairly corrugated appearance. A retrobulbar injection using a 50:50 mixture of Marcaine and Xylocaine was given for pain control. No complications were noted with this. The eye was then prepped and draped in usual fashion for vitreoretinal surgery. A lid speculum was placed over the eye. The conjunctiva was opened for 360 degrees, and all muscles were sewn with 2-0 silk sutures. A 41 band was selected and placed around the eye in the mid to posterior location, joined superonasally with a 70 sleeve. This was not pulled tight. At this time, the eye was modestly hypotonous and so I did go ahead and pulled it up just a little bit to aid in peripheral vitrectomy. The 41 band was fixed in all quadrants with 5-0 Mersilene sutures, joined superonasally. Three separate 25-gauge cannulas were then placed, infusion was verified, and turned to a pressure of 35 mmHg. Through these, central vitreous was removed. There was a trace vitreous hemorrhage inferiorly. The vitreous skirt was trimmed for 360 degrees with thorough inspection. Attention was turned posteriorly. There were some fixed folds present in the macula and these were elevated with ILM forceps, stripped over the central macula 00. Good ILM removal was seen on the posterior pole and I did go ahead and placed dye staining the ILM posteriorly to allow stripping of the entire posterior pole. Several small fixed folds were peeled peripherally, but the retina seemed to be relatively mobile at this time. Consideration was given to PFO, but it was not felt there was any significant additional peelable epiretinal membrane. Therefore, a single retinotomy was created quite superiorly which verified no additional breaks or tears. Fluid-air exchange was then performed and the retina was reattached nicely on the band. Good drainage of subretinal fluid was achieved. The laser was then placed in the periphery for approximately 791 spots. Good laser uptake at this time was felt to be achieved including around the lager tear superonasally as well as inferotemporal break. Buckle was then pulled to an appropriate height. This felt like it supported these breaks superiorly nicely, and the one final drainage was performed under microscopic vitrectomy. There was still slight corrugation posteriorly, but I could not see any significant loculated subretinal fluid. Good laser uptake was in place and it was felt this was an excellent result. The conjunctiva was pulled anteriorly. A 40 mL exchange of 15% C3F8 was carried out. All sclerotomy sites were closed with 6-0 plain. The eye was left to a pressure of 17 mmHg to 21 mmHg with Barraquer tonometry. The conjunctiva was closed with 6-0 plain and subconjunctival Celestone and Ancef were irrigated in all quadrants. The eye was patched and shielded with Maxitrol ointment as well as brimonidine drops. He was taken to the recovery room after awakened from general anesthesia in good condition.       MD SAMI Burch/[id number]  D:  08/04/2022 15:00  T:  08/05/2022 2:13  JOB #:  [id number]

## 2022-08-05 NOTE — ANESTHESIA POSTPROCEDURE EVALUATION
Department of Anesthesiology  Postprocedure Note    Patient: Dustin Aburto  MRN: [id number]  YOB: 1960  Date of evaluation: 8/5/2022      Procedure Summary     Date: 08/04/22 Room / Location: [facility] OP OR 08 / [facility]    Anesthesia Start: 1258 Anesthesia Stop: 1458    Procedures:       RIGHT EYE VITRECTOMY 25 GAUGE LASER, SCAR TISSUE REMOVAL POSSIBLE GAS FLUID EXCHANGE VS SILICON OIL PLACEMENT WITH SCLERAL  BUCKEL PLACEMENT (Right: Eye)      PROPHYLAXIS LASER OF LEFT EYE (Left: Eye) Diagnosis:       Total retinal detachment of right eye      Lattice degeneration of retina, left      (Total retinal detachment of right eye [H33.051])    Surgeons: Catie Burton MD Responsible Provider: Renée Funes MD    Anesthesia Type: General ASA Status: 3          Anesthesia Type: General    Yamilka Phase I: Yamilka Score: 10    Yamilka Phase II: Yamilka Score: 10      Anesthesia Post Evaluation    Patient location during evaluation: PACU  Patient participation: complete - patient participated  Level of consciousness: awake and alert  Airway patency: patent  Nausea & Vomiting: no nausea  Complications: no  Cardiovascular status: blood pressure returned to baseline  Respiratory status: acceptable  Hydration status: euvolemic  Multimodal analgesia pain management approach

## 2022-08-16 ENCOUNTER — TELEPHONE (OUTPATIENT)
Dept: INTERNAL MEDICINE CLINIC | Facility: CLINIC | Age: 62
End: 2022-08-16

## 2022-08-16 DIAGNOSIS — E11.9 TYPE 2 DIABETES MELLITUS WITHOUT COMPLICATION, WITHOUT LONG-TERM CURRENT USE OF INSULIN (HCC): Primary | ICD-10-CM

## 2022-08-16 RX ORDER — GLUCOSAMINE HCL/CHONDROITIN SU 500-400 MG
CAPSULE ORAL
Qty: 150 STRIP | Refills: 3 | Status: SHIPPED | OUTPATIENT
Start: 2022-08-16

## 2022-08-16 NOTE — TELEPHONE ENCOUNTER
Pt requests new Rx for OneTouch Ultra test strips (using OneTouch Ultra II meter) It is not in MyChart or on file at [facility]

## 2022-11-13 NOTE — PROGRESS NOTES
[facility] CARDIOLOGY Follow Up                 Reason for Visit: Stable ischemic heart disease    Subjective:     Patient is a 57 y.o. male with a PMH of CAD status post CABG, hyperlipidemia, hypertension, and diabetes who presents for follow-up. The patient was last seen in May 2022. A TTE was ordered. The patient had a TTE in June 2022 that was noted to demonstrate a normal EF. The patient denies angina and dyspnea. Past Medical History:   Diagnosis Date    Coronary artery disease     S/P 3 v CABG in 2019.  --NO STENTS-- followed by dr quintanilla    Diabetes mellitus, type 2 ([facility] [address]     type2-- sqbs am avg 125--- hypo at 70's    Hyperlipidemia associated with type 2 diabetes mellitus ([facility] [address]     Osteoarthritis       Past Surgical History:   Procedure Laterality Date    APPENDECTOMY      CORONARY ARTERY BYPASS GRAFT  2019    HERNIA REPAIR Left     inguinal    REFRACTIVE SURGERY Left 8/4/2022    PROPHYLAXIS LASER OF LEFT EYE performed by Pam Chacon MD at [address] [place] [address]    VITRECTOMY Right 8/4/2022    RIGHT EYE VITRECTOMY 25 GAUGE LASER, SCAR TISSUE REMOVAL POSSIBLE GAS FLUID EXCHANGE VS SILICON OIL PLACEMENT WITH SCLERAL  BUCKEL PLACEMENT performed by Pam Chacon MD at [address] [place] [address]      Family History   Problem Relation Age of Onset    Atrial Fibrillation Mother     Heart Disease Father       Social History     Tobacco Use    Smoking status: Never    Smokeless tobacco: Never   Substance Use Topics    Alcohol use: Yes     Comment: occ      No Known Allergies      ROS:  No obvious pertinent positives on review of systems except for what was outlined above.        Objective:       /74   Pulse 56   Ht 5' 7\" (1.702 m)   Wt 168 lb 12.8 oz (76.6 kg)   BMI 26.44 kg/m²     BP Readings from Last 3 Encounters:   11/15/22 106/74   08/04/22 125/73   05/13/22 130/84       Wt Readings from Last 3 Encounters:   11/15/22 168 lb 12.8 oz (76.6 kg)   08/04/22 164 lb (74.4 kg)   06/20/22 165 lb (74.8 kg)       General/Constitutional: Alert and oriented x 3, no acute distress  HEENT:   normocephalic, atraumatic, moist mucous membranes  Neck:   No JVD or carotid bruits bilaterally  Cardiovascular:   regular rate and rhythm, no rub/gallop appreciated  Pulmonary:   clear to auscultation bilaterally, no respiratory distress  Abdomen:   soft, non-tender, non-distended  Ext:   No sig LE edema bilaterally  Skin:  warm and dry, no obvious rashes seen  Neuro:   no obvious sensory or motor deficits  Psychiatric:   normal mood and affect    Data Review:   Lab Results   Component Value Date    CHOL 149 11/05/2021    CHOL 206 (H) 07/28/2021     Lab Results   Component Value Date    TRIG 79 11/05/2021    TRIG 67 07/28/2021     Lab Results   Component Value Date    HDL 72 11/05/2021    HDL 65 07/28/2021     Lab Results   Component Value Date    LDLCALC 62 11/05/2021    LDLCALC 129 (H) 07/28/2021     Lab Results   Component Value Date    VLDL 15 11/05/2021    VLDL 12 07/28/2021     No results found for: [facility]     Lab Results   Component Value Date/Time     11/05/2021 09:17 AM     07/28/2021 02:30 PM    K 5.1 11/05/2021 09:17 AM    K 4.5 07/28/2021 02:30 PM    CL 97 11/05/2021 09:17 AM     07/28/2021 02:30 PM    CO2 20 11/05/2021 09:17 AM    CO2 22 07/28/2021 02:30 PM    BUN 23 11/05/2021 09:17 AM    BUN 19 07/28/2021 02:30 PM    CREATININE 1.25 11/05/2021 09:17 AM    CREATININE 1.15 07/28/2021 02:30 PM    GLUCOSE 109 11/05/2021 09:17 AM    GLUCOSE 94 07/28/2021 02:30 PM    CALCIUM 9.2 11/05/2021 09:17 AM    CALCIUM 9.0 07/28/2021 02:30 PM         Lab Results   Component Value Date    ALT 19 11/05/2021    ALT 34 07/28/2021    AST 21 11/05/2021    AST 34 07/28/2021        Assessment/Plan:   1. Hx of CABG  - Continue with baby aspirin daily and Lipitor    2.  Hyperlipidemia, unspecified hyperlipidemia type  - Continue with Lipitor    F/U: 6 months    Shaina Dhillon MD

## 2022-11-15 ENCOUNTER — OFFICE VISIT (OUTPATIENT)
Dept: CARDIOLOGY CLINIC | Age: 62
End: 2022-11-15
Payer: COMMERCIAL

## 2022-11-15 VITALS
DIASTOLIC BLOOD PRESSURE: 74 MMHG | BODY MASS INDEX: 26.49 KG/M2 | HEIGHT: 67 IN | SYSTOLIC BLOOD PRESSURE: 106 MMHG | HEART RATE: 56 BPM | WEIGHT: 168.8 LBS

## 2022-11-15 DIAGNOSIS — Z95.1 HX OF CABG: Primary | ICD-10-CM

## 2022-11-15 DIAGNOSIS — E78.5 HYPERLIPIDEMIA, UNSPECIFIED HYPERLIPIDEMIA TYPE: ICD-10-CM

## 2022-11-15 PROCEDURE — 99213 OFFICE O/P EST LOW 20 MIN: CPT | Performed by: INTERNAL MEDICINE

## 2022-11-15 PROCEDURE — 3074F SYST BP LT 130 MM HG: CPT | Performed by: INTERNAL MEDICINE

## 2022-11-15 PROCEDURE — 3078F DIAST BP <80 MM HG: CPT | Performed by: INTERNAL MEDICINE

## 2022-11-17 NOTE — TELEPHONE ENCOUNTER
Requested Prescriptions     Pending Prescriptions Disp Refills    empagliflozin (JARDIANCE) 10 MG tablet 90 tablet 1     Sig: Take 1 tablet by mouth daily [Patient Intake Form Reviewed] : Patient intake form was reviewed [Negative] : Heme/Lymph

## 2023-05-15 NOTE — PROGRESS NOTES
[facility] CARDIOLOGY Follow Up                 Reason for Visit: Stable ischemic heart disease    Subjective:     Patient is a 57 y.o. male with a PMH of CAD status post CABG, hyperlipidemia, hypertension, and diabetes who presents for follow-up. The patient was last seen in November 2022. He had a TTE in June 2022 that was noted to demonstrate a normal EF. The patient denies chest pain and dyspnea. He reports RYLEE with Lipitor. Therefore, he discontinued the medication himself. Past Medical History:   Diagnosis Date    Coronary artery disease     S/P 3 v CABG in 2019.  --NO STENTS-- followed by dr quintanilla    Diabetes mellitus, type 2 ([facility] [address]     type2-- sqbs am avg 125--- hypo at 70's    Hyperlipidemia associated with type 2 diabetes mellitus ([facility] [address]     Osteoarthritis       Past Surgical History:   Procedure Laterality Date    APPENDECTOMY      CORONARY ARTERY BYPASS GRAFT  2019    HERNIA REPAIR Left     inguinal    REFRACTIVE SURGERY Left 8/4/2022    PROPHYLAXIS LASER OF LEFT EYE performed by Agustín Valdez MD at [address] [place] [address]    VITRECTOMY Right 8/4/2022    RIGHT EYE VITRECTOMY 25 GAUGE LASER, SCAR TISSUE REMOVAL POSSIBLE GAS FLUID EXCHANGE VS SILICON OIL PLACEMENT WITH SCLERAL  BUCKEL PLACEMENT performed by Agustín Valdez MD at [address] [place] [address]      Family History   Problem Relation Age of Onset    Atrial Fibrillation Mother     Heart Disease Father       Social History     Tobacco Use    Smoking status: Never    Smokeless tobacco: Never   Substance Use Topics    Alcohol use: Yes     Comment: occ      No Known Allergies      ROS:  No obvious pertinent positives on review of systems except for what was outlined above.        Objective:       /66   Pulse 60   Ht 5' 7\" (1.702 m)   Wt 167 lb 12.8 oz (76.1 kg)   BMI 26.28 kg/m²     BP Readings from Last 3 Encounters:   05/16/23 106/66   11/15/22 106/74   08/04/22 125/73       Wt Readings from Last 3 Encounters:   05/16/23 167 lb 12.8 oz (76.1 kg)   11/15/22 168 lb 12.8

## 2023-05-16 ENCOUNTER — OFFICE VISIT (OUTPATIENT)
Age: 63
End: 2023-05-16
Payer: COMMERCIAL

## 2023-05-16 VITALS
SYSTOLIC BLOOD PRESSURE: 106 MMHG | BODY MASS INDEX: 26.34 KG/M2 | WEIGHT: 167.8 LBS | DIASTOLIC BLOOD PRESSURE: 66 MMHG | HEART RATE: 60 BPM | HEIGHT: 67 IN

## 2023-05-16 DIAGNOSIS — E78.5 HYPERLIPIDEMIA, UNSPECIFIED HYPERLIPIDEMIA TYPE: ICD-10-CM

## 2023-05-16 DIAGNOSIS — Z95.1 HX OF CABG: Primary | ICD-10-CM

## 2023-05-16 PROCEDURE — 3078F DIAST BP <80 MM HG: CPT | Performed by: INTERNAL MEDICINE

## 2023-05-16 PROCEDURE — 3074F SYST BP LT 130 MM HG: CPT | Performed by: INTERNAL MEDICINE

## 2023-05-16 PROCEDURE — 99213 OFFICE O/P EST LOW 20 MIN: CPT | Performed by: INTERNAL MEDICINE

## 2023-05-16 RX ORDER — ROSUVASTATIN CALCIUM 20 MG/1
20 TABLET, COATED ORAL NIGHTLY
Qty: 90 TABLET | Refills: 3 | Status: SHIPPED | OUTPATIENT
Start: 2023-05-16

## 2023-09-11 RX ORDER — EMPAGLIFLOZIN 10 MG/1
10 TABLET, FILM COATED ORAL DAILY
Qty: 90 TABLET | Refills: 1 | OUTPATIENT
Start: 2023-09-11

## 2023-09-14 NOTE — TELEPHONE ENCOUNTER
Requesting refills of Jardiance, last seen on 07/28/2021, will be out of town this weekend and aware that he needs to schedule follow up visit. [employer] message also sent to patient to schedule an appointment.      Requested Prescriptions     Pending Prescriptions Disp Refills    empagliflozin (JARDIANCE) 10 MG tablet 30 tablet 0     Sig: Take 1 tablet by mouth daily     30 day supply only to [facility]

## 2023-09-20 ASSESSMENT — PATIENT HEALTH QUESTIONNAIRE - PHQ9
SUM OF ALL RESPONSES TO PHQ QUESTIONS 1-9: 0
SUM OF ALL RESPONSES TO PHQ QUESTIONS 1-9: 0
1. LITTLE INTEREST OR PLEASURE IN DOING THINGS: 0
2. FEELING DOWN, DEPRESSED OR HOPELESS: NOT AT ALL
SUM OF ALL RESPONSES TO PHQ QUESTIONS 1-9: 0
2. FEELING DOWN, DEPRESSED OR HOPELESS: 0
SUM OF ALL RESPONSES TO PHQ9 QUESTIONS 1 & 2: 0
SUM OF ALL RESPONSES TO PHQ9 QUESTIONS 1 & 2: 0
1. LITTLE INTEREST OR PLEASURE IN DOING THINGS: NOT AT ALL
SUM OF ALL RESPONSES TO PHQ QUESTIONS 1-9: 0

## 2023-09-26 ENCOUNTER — OFFICE VISIT (OUTPATIENT)
Dept: INTERNAL MEDICINE CLINIC | Facility: CLINIC | Age: 63
End: 2023-09-26
Payer: COMMERCIAL

## 2023-09-26 VITALS
HEART RATE: 56 BPM | WEIGHT: 165.8 LBS | SYSTOLIC BLOOD PRESSURE: 120 MMHG | BODY MASS INDEX: 26.02 KG/M2 | HEIGHT: 67 IN | DIASTOLIC BLOOD PRESSURE: 64 MMHG

## 2023-09-26 DIAGNOSIS — I25.10 CORONARY ARTERY DISEASE INVOLVING NATIVE CORONARY ARTERY OF NATIVE HEART WITHOUT ANGINA PECTORIS: ICD-10-CM

## 2023-09-26 DIAGNOSIS — E78.5 HYPERLIPIDEMIA ASSOCIATED WITH TYPE 2 DIABETES MELLITUS (HCC): ICD-10-CM

## 2023-09-26 DIAGNOSIS — Z23 ENCOUNTER FOR IMMUNIZATION: ICD-10-CM

## 2023-09-26 DIAGNOSIS — Z00.00 ENCOUNTER FOR PREVENTIVE HEALTH EXAMINATION: Primary | ICD-10-CM

## 2023-09-26 DIAGNOSIS — Z12.11 COLON CANCER SCREENING: ICD-10-CM

## 2023-09-26 DIAGNOSIS — E11.69 HYPERLIPIDEMIA ASSOCIATED WITH TYPE 2 DIABETES MELLITUS (HCC): ICD-10-CM

## 2023-09-26 DIAGNOSIS — Z12.5 PROSTATE CANCER SCREENING: ICD-10-CM

## 2023-09-26 DIAGNOSIS — E11.9 TYPE 2 DIABETES MELLITUS WITHOUT COMPLICATION, WITHOUT LONG-TERM CURRENT USE OF INSULIN (HCC): ICD-10-CM

## 2023-09-26 DIAGNOSIS — M15.9 PRIMARY OSTEOARTHRITIS INVOLVING MULTIPLE JOINTS: ICD-10-CM

## 2023-09-26 PROBLEM — R53.83 FATIGUE: Status: RESOLVED | Noted: 2021-06-29 | Resolved: 2023-09-26

## 2023-09-26 PROBLEM — N41.0 ACUTE PROSTATITIS: Status: RESOLVED | Noted: 2021-11-04 | Resolved: 2023-09-26

## 2023-09-26 PROBLEM — I10 HYPERTENSION: Status: RESOLVED | Noted: 2022-05-13 | Resolved: 2023-09-26

## 2023-09-26 PROBLEM — R55 NEAR SYNCOPE: Status: RESOLVED | Noted: 2021-06-29 | Resolved: 2023-09-26

## 2023-09-26 LAB
ALBUMIN SERPL-MCNC: 3.8 G/DL (ref 3.2–4.6)
ALBUMIN/GLOB SERPL: 1.4 (ref 0.4–1.6)
ALP SERPL-CCNC: 63 U/L (ref 50–136)
ALT SERPL-CCNC: 26 U/L (ref 12–65)
ANION GAP SERPL CALC-SCNC: 4 MMOL/L (ref 2–11)
AST SERPL-CCNC: 24 U/L (ref 15–37)
BASOPHILS # BLD: 0 K/UL (ref 0–0.2)
BASOPHILS NFR BLD: 1 % (ref 0–2)
BILIRUB SERPL-MCNC: 0.5 MG/DL (ref 0.2–1.1)
BUN SERPL-MCNC: 20 MG/DL (ref 8–23)
CALCIUM SERPL-MCNC: 8.8 MG/DL (ref 8.3–10.4)
CHLORIDE SERPL-SCNC: 110 MMOL/L (ref 101–110)
CHOLEST SERPL-MCNC: 217 MG/DL
CO2 SERPL-SCNC: 29 MMOL/L (ref 21–32)
CREAT SERPL-MCNC: 1.3 MG/DL (ref 0.8–1.5)
CREAT UR-MCNC: 154 MG/DL
DIFFERENTIAL METHOD BLD: ABNORMAL
EOSINOPHIL # BLD: 0.1 K/UL (ref 0–0.8)
EOSINOPHIL NFR BLD: 1 % (ref 0.5–7.8)
ERYTHROCYTE [DISTWIDTH] IN BLOOD BY AUTOMATED COUNT: 17.6 % (ref 11.9–14.6)
EST. AVERAGE GLUCOSE BLD GHB EST-MCNC: 143 MG/DL
GLOBULIN SER CALC-MCNC: 2.8 G/DL (ref 2.8–4.5)
GLUCOSE SERPL-MCNC: 148 MG/DL (ref 65–100)
HBA1C MFR BLD: 6.6 % (ref 4.8–5.6)
HCT VFR BLD AUTO: 47.8 % (ref 41.1–50.3)
HDLC SERPL-MCNC: 69 MG/DL (ref 40–60)
HDLC SERPL: 3.1
HGB BLD-MCNC: 15.2 G/DL (ref 13.6–17.2)
IMM GRANULOCYTES # BLD AUTO: 0.1 K/UL (ref 0–0.5)
IMM GRANULOCYTES NFR BLD AUTO: 2 % (ref 0–5)
LDLC SERPL CALC-MCNC: 134.2 MG/DL
LYMPHOCYTES # BLD: 1.6 K/UL (ref 0.5–4.6)
LYMPHOCYTES NFR BLD: 34 % (ref 13–44)
MCH RBC QN AUTO: 32.1 PG (ref 26.1–32.9)
MCHC RBC AUTO-ENTMCNC: 31.8 G/DL (ref 31.4–35)
MCV RBC AUTO: 101.1 FL (ref 82–102)
MICROALBUMIN UR-MCNC: 0.65 MG/DL
MICROALBUMIN/CREAT UR-RTO: 4 MG/G (ref 0–30)
MONOCYTES # BLD: 0.5 K/UL (ref 0.1–1.3)
MONOCYTES NFR BLD: 10 % (ref 4–12)
NEUTS SEG # BLD: 2.4 K/UL (ref 1.7–8.2)
NEUTS SEG NFR BLD: 52 % (ref 43–78)
NRBC # BLD: 0.14 K/UL (ref 0–0.2)
PLATELET # BLD AUTO: 213 K/UL (ref 150–450)
PMV BLD AUTO: 12 FL (ref 9.4–12.3)
POTASSIUM SERPL-SCNC: 4.5 MMOL/L (ref 3.5–5.1)
PROT SERPL-MCNC: 6.6 G/DL (ref 6.3–8.2)
PSA SERPL-MCNC: 0.9 NG/ML
RBC # BLD AUTO: 4.73 M/UL (ref 4.23–5.6)
SODIUM SERPL-SCNC: 143 MMOL/L (ref 133–143)
TRIGL SERPL-MCNC: 69 MG/DL (ref 35–150)
VLDLC SERPL CALC-MCNC: 13.8 MG/DL (ref 6–23)
WBC # BLD AUTO: 4.7 K/UL (ref 4.3–11.1)

## 2023-09-26 PROCEDURE — 99397 PER PM REEVAL EST PAT 65+ YR: CPT | Performed by: INTERNAL MEDICINE

## 2023-09-26 SDOH — ECONOMIC STABILITY: FOOD INSECURITY: WITHIN THE PAST 12 MONTHS, THE FOOD YOU BOUGHT JUST DIDN'T LAST AND YOU DIDN'T HAVE MONEY TO GET MORE.: NEVER TRUE

## 2023-09-26 SDOH — ECONOMIC STABILITY: HOUSING INSECURITY
IN THE LAST 12 MONTHS, WAS THERE A TIME WHEN YOU DID NOT HAVE A STEADY PLACE TO SLEEP OR SLEPT IN A SHELTER (INCLUDING NOW)?: NO

## 2023-09-26 SDOH — ECONOMIC STABILITY: INCOME INSECURITY: HOW HARD IS IT FOR YOU TO PAY FOR THE VERY BASICS LIKE FOOD, HOUSING, MEDICAL CARE, AND HEATING?: NOT HARD AT ALL

## 2023-09-26 SDOH — ECONOMIC STABILITY: FOOD INSECURITY: WITHIN THE PAST 12 MONTHS, YOU WORRIED THAT YOUR FOOD WOULD RUN OUT BEFORE YOU GOT MONEY TO BUY MORE.: NEVER TRUE

## 2023-09-26 ASSESSMENT — ENCOUNTER SYMPTOMS
VOICE CHANGE: 0
RECTAL PAIN: 0
STRIDOR: 0
CHOKING: 0
EYE PAIN: 0

## 2023-09-26 NOTE — PROGRESS NOTES
FOLLOWUP VISIT / ANNUAL EXAM    Subjective:    Mr. Babita Asif is a 70 y.o., male,   Chief Complaint   Patient presents with    Annual Exam    Groin Pain     Left worse with bending over     Joint Pain     Bilateral hands        HPI:    This patient is a 68-year-old male with the below noted medical history. He was last seen in November 2021 at which time he had been advised to have a 1 month follow-up appointment but he never returned. He recently called for refills and was advised that he needed an appointment. He would also like an annual physical.      He also complains of left groin pain primarily when he leans forward and simultaneously flexes his left hip such as when putting on a pair of shoes or pulling on a pair of socks. This pain is mild and does not occur every day. For example he cannot reproduce the pain today but it was bothering him a few days ago. He also complains of bilateral finger joint pain and stiffness slowly worsening for years. Also notes symptoms with weather changes. The patient has coronary artery disease. The patient has been attempting to follow a heart healthy diet and exercise. The patient denies chest pain, shortness of breath, dyspnea on exertion, or PND. The patient has diabetes mellitus. The patient denies any symptoms of hypo or hyperglycemia. The patient has been attempting to be compliant with an ADA diet and an exercise program.     The patient has hyperlipidemia. The patient has been following a low cholesterol diet and denies any myalgias or weakness on current lipid lowering therapy.        The following portions of the patient's history were reviewed and updated as appropriate:      Past Medical History:   Diagnosis Date    Coronary artery disease     S/P 3 v CABG in 2019.  --NO STENTS-- followed by dr quintanilla    Diabetes mellitus, type 2 ([address]     Hyperlipidemia associated with type 2 diabetes mellitus ([address])     Osteoarthritis        Past Surgical

## 2023-09-27 DIAGNOSIS — E11.9 TYPE 2 DIABETES MELLITUS WITHOUT COMPLICATION, WITHOUT LONG-TERM CURRENT USE OF INSULIN (HCC): Primary | ICD-10-CM

## 2023-09-27 DIAGNOSIS — E11.69 HYPERLIPIDEMIA ASSOCIATED WITH TYPE 2 DIABETES MELLITUS (HCC): ICD-10-CM

## 2023-09-27 DIAGNOSIS — E78.5 HYPERLIPIDEMIA ASSOCIATED WITH TYPE 2 DIABETES MELLITUS (HCC): ICD-10-CM

## 2023-09-28 ENCOUNTER — TELEPHONE (OUTPATIENT)
Dept: INTERNAL MEDICINE CLINIC | Facility: CLINIC | Age: 63
End: 2023-09-28

## 2023-09-28 NOTE — RESULT ENCOUNTER NOTE
Please call patient. His labs look okay. Please ask him to have labs done one week BEFORE his next visit in March.

## 2023-09-28 NOTE — TELEPHONE ENCOUNTER
----- Message from Whitney Acosta MD sent at 9/27/2023  9:04 PM EDT -----  Please call patient. His labs look okay. Please ask him to have labs done one week BEFORE his next visit in March.

## 2023-10-12 DIAGNOSIS — R19.5 POSITIVE COLORECTAL CANCER SCREENING USING COLOGUARD TEST: Primary | ICD-10-CM

## 2023-10-12 LAB — NONINV COLON CA DNA+OCC BLD SCRN STL QL: POSITIVE

## 2023-10-26 PROBLEM — Z00.00 ENCOUNTER FOR PREVENTIVE HEALTH EXAMINATION: Status: RESOLVED | Noted: 2023-09-26 | Resolved: 2023-10-26

## 2023-10-27 PROBLEM — Z12.5 PROSTATE CANCER SCREENING: Status: RESOLVED | Noted: 2021-07-28 | Resolved: 2023-10-27

## 2023-10-31 NOTE — PROGRESS NOTES
Name: Hemant Irizarry      MRN: [id number]       : 1960       Age: 70 y.o. Sex: male        Trung Pérez MD       CC:  No chief complaint on file. HPI:  The patient is referred here for a colonoscopy by Dr. Trung Pérez. The patient had a recent Cologuard test that was positive. Had a colonoscopy 20 years ago which was negative. No recent abdominal pain, nausea or vomiting. Family hx of colon cancer No      Previous colonoscopy Yes   BRBPR No   Melena No   Loss of appetite No   Weight loss No      Change in bowel habits No       HISTORY:    Past Medical History:   Diagnosis Date    Coronary artery disease     S/P 3 v CABG in 2019.  --NO STENTS-- followed by dr quintanilla    Diabetes mellitus, type 2 ([address])     Hyperlipidemia associated with type 2 diabetes mellitus ([address])     Osteoarthritis      Past Surgical History:   Procedure Laterality Date    APPENDECTOMY      CATARACT REMOVAL Right 2022    CORONARY ARTERY BYPASS GRAFT  2019    HERNIA REPAIR Left     inguinal    REFRACTIVE SURGERY Left 2022    PROPHYLAXIS LASER OF LEFT EYE performed by Sreedhar Baig MD at [address] [place] [address] Rd    VITRECTOMY Right 2022    RIGHT EYE VITRECTOMY 25 GAUGE LASER, SCAR TISSUE REMOVAL POSSIBLE GAS FLUID EXCHANGE VS SILICON OIL PLACEMENT WITH SCLERAL  BUCKEL PLACEMENT performed by Sreedhar Baig MD at [address] [place] [address] Rd     Prior to Admission medications    Medication Sig Start Date End Date Taking? Authorizing Provider   empagliflozin (JARDIANCE) 10 MG tablet Take 1 tablet by mouth daily 10/25/23   Trung Pérez MD   UNABLE TO FIND daily Relief Factor    Provider, MD Klaudia   rosuvastatin (CRESTOR) 20 MG tablet Take 1 tablet by mouth nightly 23   Fiona Ortiz MD   blood glucose monitor strips Test 1 time a day & as needed for symptoms of irregular blood glucose. Dispense sufficient amount for indicated testing frequency plus additional to accommodate PRN testing needs.  Pt using OneTouch Ultra II meter 22

## 2023-11-02 ENCOUNTER — OFFICE VISIT (OUTPATIENT)
Dept: SURGERY | Age: 63
End: 2023-11-02

## 2023-11-02 VITALS
DIASTOLIC BLOOD PRESSURE: 78 MMHG | HEART RATE: 54 BPM | WEIGHT: 168.8 LBS | SYSTOLIC BLOOD PRESSURE: 138 MMHG | BODY MASS INDEX: 26.44 KG/M2

## 2023-11-02 DIAGNOSIS — R19.5 POSITIVE COLORECTAL CANCER SCREENING USING COLOGUARD TEST: Primary | ICD-10-CM

## 2023-11-02 RX ORDER — CETIRIZINE HYDROCHLORIDE 5 MG/1
5 TABLET ORAL DAILY
COMMUNITY

## 2023-11-06 ENCOUNTER — PREP FOR PROCEDURE (OUTPATIENT)
Dept: SURGERY | Age: 63
End: 2023-11-06

## 2023-11-06 DIAGNOSIS — R19.5 POSITIVE COLORECTAL CANCER SCREENING USING COLOGUARD TEST: ICD-10-CM

## 2023-11-09 NOTE — H&P
Name: Kelly Snyder      MRN: [id number]       : 1960       Age: 70 y.o. Sex: male        Faustina Chirinos MD       CC:  No chief complaint on file. HPI:  The patient is referred here for a colonoscopy by Dr. Faustina Chirinos. The patient had a recent Cologuard test that was positive. Had a colonoscopy 20 years ago which was negative. No recent abdominal pain, nausea or vomiting. Family hx of colon cancer No      Previous colonoscopy Yes   BRBPR No   Melena No   Loss of appetite No   Weight loss No      Change in bowel habits No       HISTORY:    Past Medical History:   Diagnosis Date    Coronary artery disease     S/P 3 v CABG in 2019.  --NO STENTS-- followed by dr Lana Gates    Diabetes mellitus, type 2 ([address])     Hyperlipidemia associated with type 2 diabetes mellitus ([address])     Osteoarthritis      Past Surgical History:   Procedure Laterality Date    APPENDECTOMY      CATARACT REMOVAL Right 2022    CORONARY ARTERY BYPASS GRAFT  2019    HERNIA REPAIR Left 2016    inguinal    REFRACTIVE SURGERY Left 2022    PROPHYLAXIS LASER OF LEFT EYE performed by Harshad Knapp MD at [address] [facility] [address]    VITRECTOMY Right 2022    RIGHT EYE VITRECTOMY 25 GAUGE LASER, SCAR TISSUE REMOVAL POSSIBLE GAS FLUID EXCHANGE VS SILICON OIL PLACEMENT WITH SCLERAL  BUCKEL PLACEMENT performed by Harshad Knapp MD at [address] [facility] [address]     Prior to Admission medications    Medication Sig Start Date End Date Taking? Authorizing Provider   cetirizine (ZYRTEC) 5 MG tablet Take 1 tablet by mouth daily    ProviderKlaudia MD   empagliflozin (JARDIANCE) 10 MG tablet Take 1 tablet by mouth daily 10/25/23   Faustina Chirinos MD   UNABLE TO FIND daily Relief Factor  Patient not taking: Reported on 2023    Klaudia Archuleta MD   rosuvastatin (CRESTOR) 20 MG tablet Take 1 tablet by mouth nightly 23   Shahriar Kam MD   blood glucose monitor strips Test 1 time a day & as needed for symptoms of irregular blood glucose.  Dispense

## 2023-11-11 PROBLEM — Z12.11 COLON CANCER SCREENING: Status: RESOLVED | Noted: 2021-07-28 | Resolved: 2023-11-11

## 2023-11-13 ENCOUNTER — TELEPHONE (OUTPATIENT)
Dept: SURGERY | Age: 63
End: 2023-11-13

## 2023-11-13 NOTE — TELEPHONE ENCOUNTER
[facility] scheduled for: 11/15/23      *Has patient picked up medications Miralax, Dulcolax, Gatorade or drink of choice-not red)? Yes       *Discussed instructions for how to take these and instructions for the week prior to your procedure? Yes       *Discussed instructions for the next couple of days? Yes       *Patient reminded of location of procedure and time of arrival?     Yes       *Who will be bringing patient and staying at the hospital with them during your procedure? spouse      *Informed patient that they should receive a call from the hospital as well to pre-register them for this procedure? Yes      *Informed them of contact info if needed to cancel or reschedule this procedure?      Yes

## 2023-11-14 ENCOUNTER — ANESTHESIA EVENT (OUTPATIENT)
Dept: ENDOSCOPY | Age: 63
End: 2023-11-14
Payer: COMMERCIAL

## 2023-11-14 NOTE — PERIOP NOTE
Patient verified name, , and procedure. Type: 1a; abbreviated assessment per anesthesia guidelines    Labs per anesthesia: poc glucose    Instructed pt that they will be notified the day before their procedure by the GI Lab for time of arrival if their procedure is [place] and Pre-op for [place] cases. Arrival times should be called by 5 pm. If no phone is received the patient should contact their respective hospital. The GI lab telephone number is [phone] and ES Pre-op is [phone]. Follow diet and prep instructions per office including NPO status. If patient has NOT received instructions from office patient is advised to call surgeon office, verbalizes understanding. Bath or shower the night before and the am of surgery with non-moisturizing soap. No lotions, oils, powders, cologne on skin. No make up, eye make up or jewelry. Wear loose fitting comfortable, clean clothing. Must have adult present in building the entire time . Medications for the day of procedure none, patient to hold Empaglifloxin (Jardiance) per anesthesia guidelines. The following discharge instructions reviewed with patient: medication given during procedure may cause drowsiness for several hours, therefore, do not drive or operate machinery for remainder of the day. You may not drink alcohol on the day of your procedure, please resume regular diet and activity unless otherwise directed. You may experience abdominal distention for several hours that is relieved by the passage of gas. Contact your physician if you have any of the following: fever or chills, severe abdominal pain or excessive amount of bleeding or a large amount when having a bowel movement.  Occasional specks of blood with bowel movement would not be unusual.

## 2023-11-15 ENCOUNTER — TELEPHONE (OUTPATIENT)
Dept: SURGERY | Age: 63
End: 2023-11-15

## 2023-11-15 ENCOUNTER — HOSPITAL ENCOUNTER (OUTPATIENT)
Age: 63
Setting detail: OUTPATIENT SURGERY
Discharge: HOME | End: 2023-11-15
Attending: SURGERY | Admitting: SURGERY
Payer: COMMERCIAL

## 2023-11-15 ENCOUNTER — ANESTHESIA (OUTPATIENT)
Dept: ENDOSCOPY | Age: 63
End: 2023-11-15
Payer: COMMERCIAL

## 2023-11-15 VITALS
DIASTOLIC BLOOD PRESSURE: 78 MMHG | WEIGHT: 165 LBS | OXYGEN SATURATION: 97 % | RESPIRATION RATE: 16 BRPM | BODY MASS INDEX: 25.9 KG/M2 | HEART RATE: 57 BPM | SYSTOLIC BLOOD PRESSURE: 117 MMHG | HEIGHT: 67 IN | TEMPERATURE: 98.1 F

## 2023-11-15 LAB
GLUCOSE BLD STRIP.AUTO-MCNC: 103 MG/DL (ref 65–100)
SERVICE CMNT-IMP: ABNORMAL

## 2023-11-15 PROCEDURE — 2580000003 HC RX 258: Performed by: STUDENT IN AN ORGANIZED HEALTH CARE EDUCATION/TRAINING PROGRAM

## 2023-11-15 PROCEDURE — 2500000003 HC RX 250 WO HCPCS

## 2023-11-15 PROCEDURE — 7100000010 HC PHASE II RECOVERY - FIRST 15 MIN: Performed by: SURGERY

## 2023-11-15 PROCEDURE — 3700000001 HC ADD 15 MINUTES (ANESTHESIA): Performed by: SURGERY

## 2023-11-15 PROCEDURE — 82962 GLUCOSE BLOOD TEST: CPT

## 2023-11-15 PROCEDURE — 3609010600 HC COLONOSCOPY POLYPECTOMY SNARE/COLD BIOPSY: Performed by: SURGERY

## 2023-11-15 PROCEDURE — 88305 TISSUE EXAM BY PATHOLOGIST: CPT

## 2023-11-15 PROCEDURE — 6360000002 HC RX W HCPCS

## 2023-11-15 PROCEDURE — 7100000011 HC PHASE II RECOVERY - ADDTL 15 MIN: Performed by: SURGERY

## 2023-11-15 PROCEDURE — 2709999900 HC NON-CHARGEABLE SUPPLY: Performed by: SURGERY

## 2023-11-15 PROCEDURE — 3700000000 HC ANESTHESIA ATTENDED CARE: Performed by: SURGERY

## 2023-11-15 RX ORDER — PROPOFOL 10 MG/ML
INJECTION, EMULSION INTRAVENOUS PRN
Status: DISCONTINUED | OUTPATIENT
Start: 2023-11-15 | End: 2023-11-15 | Stop reason: SDUPTHER

## 2023-11-15 RX ORDER — SODIUM CHLORIDE 0.9 % (FLUSH) 0.9 %
5-40 SYRINGE (ML) INJECTION EVERY 12 HOURS SCHEDULED
Status: DISCONTINUED | OUTPATIENT
Start: 2023-11-15 | End: 2023-11-15 | Stop reason: HOSPADM

## 2023-11-15 RX ORDER — LIDOCAINE HYDROCHLORIDE 20 MG/ML
INJECTION, SOLUTION EPIDURAL; INFILTRATION; INTRACAUDAL; PERINEURAL PRN
Status: DISCONTINUED | OUTPATIENT
Start: 2023-11-15 | End: 2023-11-15 | Stop reason: SDUPTHER

## 2023-11-15 RX ORDER — LIDOCAINE HYDROCHLORIDE 10 MG/ML
1 INJECTION, SOLUTION INFILTRATION; PERINEURAL
Status: DISCONTINUED | OUTPATIENT
Start: 2023-11-15 | End: 2023-11-15 | Stop reason: HOSPADM

## 2023-11-15 RX ORDER — SODIUM CHLORIDE, SODIUM LACTATE, POTASSIUM CHLORIDE, CALCIUM CHLORIDE 600; 310; 30; 20 MG/100ML; MG/100ML; MG/100ML; MG/100ML
INJECTION, SOLUTION INTRAVENOUS CONTINUOUS
Status: DISCONTINUED | OUTPATIENT
Start: 2023-11-15 | End: 2023-11-15 | Stop reason: HOSPADM

## 2023-11-15 RX ORDER — SODIUM CHLORIDE 0.9 % (FLUSH) 0.9 %
5-40 SYRINGE (ML) INJECTION PRN
Status: DISCONTINUED | OUTPATIENT
Start: 2023-11-15 | End: 2023-11-15 | Stop reason: HOSPADM

## 2023-11-15 RX ORDER — SODIUM CHLORIDE 9 MG/ML
INJECTION, SOLUTION INTRAVENOUS PRN
Status: DISCONTINUED | OUTPATIENT
Start: 2023-11-15 | End: 2023-11-15 | Stop reason: HOSPADM

## 2023-11-15 RX ADMIN — PROPOFOL 100 MG: 10 INJECTION, EMULSION INTRAVENOUS at 09:58

## 2023-11-15 RX ADMIN — LIDOCAINE HYDROCHLORIDE 50 MG: 20 INJECTION, SOLUTION EPIDURAL; INFILTRATION; INTRACAUDAL; PERINEURAL at 09:58

## 2023-11-15 RX ADMIN — SODIUM CHLORIDE, POTASSIUM CHLORIDE, SODIUM LACTATE AND CALCIUM CHLORIDE: 600; 310; 30; 20 INJECTION, SOLUTION INTRAVENOUS at 09:15

## 2023-11-15 RX ADMIN — PROPOFOL 160 MCG/KG/MIN: 10 INJECTION, EMULSION INTRAVENOUS at 09:59

## 2023-11-15 ASSESSMENT — PAIN SCALES - GENERAL
PAINLEVEL_OUTOF10: 0

## 2023-11-15 ASSESSMENT — PAIN - FUNCTIONAL ASSESSMENT: PAIN_FUNCTIONAL_ASSESSMENT: 0-10

## 2023-11-15 NOTE — ANESTHESIA POSTPROCEDURE EVALUATION
Department of Anesthesiology  Postprocedure Note    Patient: Meenakshi Sandoval  MRN: [id number]  YOB: 1960  Date of evaluation: 11/15/2023      Procedure Summary     Date: 11/15/23 Room / Location: [facility] ENDO 03 / [facility] ENDOSCOPY    Anesthesia Start: 9226 Anesthesia Stop: 9016    Procedure: COLONOSCOPY POLYPECTOMY COLD BIOPSY Diagnosis:       Positive colorectal cancer screening using Cologuard test      (Positive colorectal cancer screening using Cologuard test [R19.5])    Surgeons:  Yo Mcguire MD Responsible Provider: Jus Viramontes MD    Anesthesia Type: TIVA ASA Status: 3          Anesthesia Type: TIVA    Yamilka Phase I: Yamilka Score: 10    Yamilka Phase II: Yamilka Score: 10      Anesthesia Post Evaluation    Patient location during evaluation: bedside  Patient participation: complete - patient participated  Level of consciousness: awake and alert  Airway patency: patent  Nausea & Vomiting: no vomiting  Complications: no  Cardiovascular status: hemodynamically stable  Respiratory status: acceptable  Hydration status: euvolemic  Pain management: adequate

## 2023-11-15 NOTE — OP NOTE
[facility]  OPERATIVE REPORT    Name:  Xochilt Cody  MR#:  [id number]  :  1960  ACCOUNT #:  [de-identified]  DATE OF SERVICE:  11/15/2023    PREOPERATIVE DIAGNOSIS:  Positive Cologuard test.    POSTOPERATIVE DIAGNOSES:  1. Good bowel prep. 2.  Sigmoid polyp found at 25 cm. and excised. PROCEDURE PERFORMED:  Colonoscopy with polypectomy x1. SURGEON:  Endy Edwards MD    ASSISTANT:  None. ANESTHESIA:  Monitored anesthesia care by Dr. Hillary Bailey and Jenn Kim, the nurse anesthetist.    COMPLICATIONS:  None. SPECIMENS REMOVED:  Sigmoid pathology, sent to pathology. IMPLANTS:  None. ESTIMATED BLOOD LOSS:  None. DRAINS:  None. FINDINGS:  1. Good bowel prep. 2.  Sigmoid polyp which was found and excised. HISTORY:  This is a 53-year-old male who I was asked to see by Dr. Steph Kovacs for a positive Cologuard test.  The patient was seen in my office. The procedure was scheduled. I went through risks of bleeding, infection, anesthesia, perforation of colon. He was agreeable, signed a consent form, was scheduled for 11/15/2023. The patient was seen in the preop area with wife, then transported to room #3 [address] [place] [address]. Placed on a stretcher in left lateral decubitus position. Oxygen via nasal cannula was administered. The O2 sats and vital signs were monitored by the Anesthesia staff throughout the procedure. Time-out was done identifying the patient, surgical procedure and birth date of 1960. When everyone agreed, monitored anesthesia care was done by Dr. Hillary Bailey and Jenn Kim, the CRNA. Once the sedative effect had taken hold, an adult colonoscope was advanced through the anus and all the way to the cecum. The sigmoid colon was somewhat tortuous, but we were able to reach the cecum. Cecum was identified with the ileocecal valve, cecal folds.   External compression in the right lower quadrant corresponded to an indentation seen with the scope. Scope was slowly withdrawn over a 6-minute period of time. The overall bowel prep was good. There were no lesions noted in cecum, ascending colon, transverse or descending colon. In the sigmoid colon at 25 cm, there was a sessile polyp that was excised with the biopsy forceps and sent to pathology for evaluation. There was limited bleeding from this, less than 2 mL. Scope was brought back to the rectum, it was retroflexed. There was nothing noted on retroflexion. Digital rectal exam revealed good sphincter tone. No mass was palpated. PLAN:  He will call the office at [phone] in 2 weeks to get the polypectomy results.         MD JOSE [id number]  D:  11/15/2023 10:31  T:  11/15/2023 12:20  JOB #:  [id number]

## 2023-11-15 NOTE — TELEPHONE ENCOUNTER
Returned call to patient's wife, Carlos Daigle, regarding billing information to determine out-of-pocket costs. Provided Mrs. Xenia Christy with Wesly Hathaway's contact information.  Xenia Christy voiced understanding.

## 2023-11-15 NOTE — INTERVAL H&P NOTE
Update History & Physical    The patient's History and Physical of 11/9/23 was reviewed with the patient and I examined the patient. There was no change. The surgical site was confirmed by the patient and me. Plan: The risks, benefits, expected outcome, and alternative to the recommended procedure have been discussed with the patient. Patient understands and wants to proceed with the procedure.      Electronically signed by Gaylord Curling, MD on 11/15/2023 at 8:57 AM

## 2023-11-16 ENCOUNTER — TELEPHONE (OUTPATIENT)
Dept: SURGERY | Age: 63
End: 2023-11-16

## 2023-11-16 NOTE — TELEPHONE ENCOUNTER
Pt left a voiemail asking if he can use lucia lax in place of the Metamucil like his discharge summary suggests. I called and left a message that he can use the lucia lax.

## 2023-12-01 ENCOUNTER — TELEPHONE (OUTPATIENT)
Dept: SURGERY | Age: 63
End: 2023-12-01

## 2023-12-01 NOTE — TELEPHONE ENCOUNTER
Surgery  I called the patient this afternoon to tell him the pathology results. He needs a repeat colonoscopy in 5 years.    [facility], MD.

## 2024-03-26 ENCOUNTER — OFFICE VISIT (OUTPATIENT)
Dept: INTERNAL MEDICINE CLINIC | Facility: CLINIC | Age: 64
End: 2024-03-26
Payer: COMMERCIAL

## 2024-03-26 VITALS
HEART RATE: 51 BPM | SYSTOLIC BLOOD PRESSURE: 108 MMHG | OXYGEN SATURATION: 99 % | BODY MASS INDEX: 26.43 KG/M2 | WEIGHT: 168.4 LBS | HEIGHT: 67 IN | DIASTOLIC BLOOD PRESSURE: 60 MMHG

## 2024-03-26 DIAGNOSIS — E11.69 HYPERLIPIDEMIA ASSOCIATED WITH TYPE 2 DIABETES MELLITUS (HCC): ICD-10-CM

## 2024-03-26 DIAGNOSIS — Z12.11 COLON CANCER SCREENING: Chronic | ICD-10-CM

## 2024-03-26 DIAGNOSIS — R06.09 CHRONIC DYSPNEA: Chronic | ICD-10-CM

## 2024-03-26 DIAGNOSIS — Z12.5 PROSTATE CANCER SCREENING: Chronic | ICD-10-CM

## 2024-03-26 DIAGNOSIS — E78.5 HYPERLIPIDEMIA ASSOCIATED WITH TYPE 2 DIABETES MELLITUS (HCC): ICD-10-CM

## 2024-03-26 DIAGNOSIS — R06.09 DYSPNEA ON EXERTION: ICD-10-CM

## 2024-03-26 DIAGNOSIS — E11.9 TYPE 2 DIABETES MELLITUS WITHOUT COMPLICATION, WITHOUT LONG-TERM CURRENT USE OF INSULIN (HCC): ICD-10-CM

## 2024-03-26 DIAGNOSIS — I25.10 CORONARY ARTERY DISEASE INVOLVING NATIVE CORONARY ARTERY OF NATIVE HEART WITHOUT ANGINA PECTORIS: ICD-10-CM

## 2024-03-26 DIAGNOSIS — H93.13 TINNITUS OF BOTH EARS: ICD-10-CM

## 2024-03-26 DIAGNOSIS — E11.9 TYPE 2 DIABETES MELLITUS WITHOUT COMPLICATION, WITHOUT LONG-TERM CURRENT USE OF INSULIN (HCC): Primary | ICD-10-CM

## 2024-03-26 PROBLEM — Z00.00 ENCOUNTER FOR PREVENTIVE HEALTH EXAMINATION: Chronic | Status: ACTIVE | Noted: 2023-09-26

## 2024-03-26 LAB
ANION GAP SERPL CALC-SCNC: 5 MMOL/L (ref 2–11)
BUN SERPL-MCNC: 22 MG/DL (ref 8–23)
CALCIUM SERPL-MCNC: 9.2 MG/DL (ref 8.3–10.4)
CHLORIDE SERPL-SCNC: 106 MMOL/L (ref 103–113)
CHOLEST SERPL-MCNC: 228 MG/DL
CO2 SERPL-SCNC: 28 MMOL/L (ref 21–32)
CREAT SERPL-MCNC: 1.2 MG/DL (ref 0.8–1.5)
GLUCOSE SERPL-MCNC: 136 MG/DL (ref 65–100)
HDLC SERPL-MCNC: 59 MG/DL (ref 40–60)
HDLC SERPL: 3.9
LDLC SERPL CALC-MCNC: 156.6 MG/DL
POTASSIUM SERPL-SCNC: 4.3 MMOL/L (ref 3.5–5.1)
SODIUM SERPL-SCNC: 139 MMOL/L (ref 136–146)
TRIGL SERPL-MCNC: 62 MG/DL (ref 35–150)
VLDLC SERPL CALC-MCNC: 12.4 MG/DL (ref 6–23)

## 2024-03-26 PROCEDURE — 99214 OFFICE O/P EST MOD 30 MIN: CPT | Performed by: INTERNAL MEDICINE

## 2024-03-26 PROCEDURE — 93000 ELECTROCARDIOGRAM COMPLETE: CPT | Performed by: INTERNAL MEDICINE

## 2024-03-26 PROCEDURE — 1123F ACP DISCUSS/DSCN MKR DOCD: CPT | Performed by: INTERNAL MEDICINE

## 2024-03-26 ASSESSMENT — PATIENT HEALTH QUESTIONNAIRE - PHQ9
SUM OF ALL RESPONSES TO PHQ9 QUESTIONS 1 & 2: 0
SUM OF ALL RESPONSES TO PHQ QUESTIONS 1-9: 0
1. LITTLE INTEREST OR PLEASURE IN DOING THINGS: NOT AT ALL
SUM OF ALL RESPONSES TO PHQ QUESTIONS 1-9: 0
2. FEELING DOWN, DEPRESSED OR HOPELESS: NOT AT ALL

## 2024-03-26 ASSESSMENT — ENCOUNTER SYMPTOMS
EYE PAIN: 0
RECTAL PAIN: 0
STRIDOR: 0
CHOKING: 0
VOICE CHANGE: 0

## 2024-03-26 NOTE — PROGRESS NOTES
screening  Overview:  11/15/23 colonoscopy (Dr. Gunter) - one hyperplastic polyp.    10/3/23 cologuard positive.    5. Chronic dyspnea  Overview:  3/26/24 EKG - sinus bradycardia at 48, RBBB (chronic), no acute ST-T changes.    9/6/23 CBC and CMP unremarkable.    6/20/22 echo - Normal LVEF 57%. LV size is normal. Normal wall thickness. Normal wall motion. Normal diastolic function.   5/6/22 Nuclear stress - normal perfusion.      The patient describes chronic mild dyspnea on exertion for several years.  n retrospect patient believes that he had a bad case of Covid 19 in 2020 (did not require hospitalization but it was \"close\") and it was after Covid that his mild chronic dyspnea began.  He has no cough / wheezing / sputum or other pulmonary symptoms.  Lifelong nonsmoker.  Respiratory rate and room air oxygenation normal.  Unlikely related to his sinus bradycardia as patient reports no chronotropic incompetence and states that his heart rate rises with exercise.  Offered him referral to pulmonary for evaluation... patient agrees.    Orders:  -     [facility]  6. Dyspnea on exertion  -     EKG 12 Lead  -     [facility]  7. Tinnitus of both ears  Overview:  Patient reports chronic tinnitus.  Both external auditory canals and TMs appear normal.  This is frequently related to presbycusis.  Offered him referral to ENT/audiology for evaluation which he declines at this time.  He will let me know if it gets worse and he desires referral.  8. Prostate cancer screening  Overview:  9/26/23 PSA 0.9    Orders:  -     PSA Screening; Future        The patient and/or patient representative voiced understanding and agreement with the current diagnoses, recommendations, and possible side effects.    Return in about 6 months (around 9/26/2024) for follow up of chronic medical problems, review labs.

## 2024-03-27 LAB
EST. AVERAGE GLUCOSE BLD GHB EST-MCNC: 157 MG/DL
HBA1C MFR BLD: 7.1 % (ref 4.8–5.6)

## 2024-03-29 ENCOUNTER — TELEPHONE (OUTPATIENT)
Dept: INTERNAL MEDICINE CLINIC | Facility: CLINIC | Age: 64
End: 2024-03-29

## 2024-03-29 NOTE — TELEPHONE ENCOUNTER
----- Message from Burton Shepard MD sent at 3/28/2024  5:48 PM EDT -----  Call patient.  His LDL cholesterol is elevated.  Has he \"really\" been taking Crestor 20 mg daily without missed doses?  If not please start taking.  If yes, increase rosuvastatin from 20 to 40 mg daily.  Repeat labs one week BEFORE his next appointme  nt.

## 2024-04-22 ENCOUNTER — PATIENT MESSAGE (OUTPATIENT)
Age: 64
End: 2024-04-22

## 2024-04-22 ENCOUNTER — TELEPHONE (OUTPATIENT)
Age: 64
End: 2024-04-22

## 2024-04-22 NOTE — TELEPHONE ENCOUNTER
Jamie Morales MD Keener, Lynn F, RN  Caller: Unspecified (Today, 12:59 PM)  Please see my last clinic note. I do no prescribe Jardiance for this patient, and he has no cardiac indication for it.  Please schedule the patient to see me at my earliest availability without overbooking.  I will defer Jardiance to his other providers.  He is overdue for a visit.

## 2024-04-22 NOTE — TELEPHONE ENCOUNTER
Advised patient of Dr. Morales's response. Scheduled next available SA appointment with Dr. Morales on 5/1/24 at 8:30 am. Patient verbalized understanding.

## 2024-04-22 NOTE — TELEPHONE ENCOUNTER
----- Message from Graciela Baird MA sent at 4/22/2024 11:20 AM EDT -----  Regarding: FW: Medicare ? JARDIANCE  Contact: [phone]    ----- Message -----  From: Chuck Klein  Sent: 4/22/2024  11:15 AM EDT  To: l [facility] Cardiology Clinical Staff  Subject: Medicare ? JARDIANCE                             Good morning!  I'm looking at Medicare plans looking to possibly retire July.  I'm told that Jardiance would be totally expensive and can you recommend another generic medication for me (like metformin)? that would be affordable and not throw me in the \"donut hole\" for prescriptions?    Dr. Shepard noted that Jardiance is also beneficial for cardio as well as diabetes, so I wanted to reach out to you as well.    I am currently on 10mg Jardiance.    Thanks for your recommendations!    Chuck

## 2024-04-29 NOTE — PROGRESS NOTES
[facility] CARDIOLOGY Follow Up                 Reason for Visit: CCD     Subjective:      Patient is a 63 y.o. male with a PMH of CAD status post CABG, hyperlipidemia, hypertension, chronic sinus bradycardia and diabetes who presents for follow-up.  The patient was last seen in May 2023.  He had a TTE in June 2022 that was noted to demonstrate a normal EF.  The patient denies chest pain and has chronic dyspnea with no significant change in clinical status.    Past Medical History:   Diagnosis Date    Coronary artery disease     S/P 3 v CABG in 2019.  --NO STENTS-- followed by dr quintanilla    Diabetes mellitus, type 2 (HCC)     on oral agent, does not check fbs    Hyperlipidemia associated with type 2 diabetes mellitus (HCC)     Osteoarthritis       Past Surgical History:   Procedure Laterality Date    APPENDECTOMY      CATARACT REMOVAL Right 08/2022    COLONOSCOPY N/A 11/15/2023    COLONOSCOPY POLYPECTOMY COLD BIOPSY performed by Joshua Gunter MD at [facility] ENDOSCOPY    CORONARY ARTERY BYPASS GRAFT  2019    HERNIA REPAIR Left 2016    inguinal    REFRACTIVE SURGERY Left 08/04/2022    PROPHYLAXIS LASER OF LEFT EYE performed by Jarred Serrano MD at [facility] OPC    VITRECTOMY Right 08/04/2022    RIGHT EYE VITRECTOMY 25 GAUGE LASER, SCAR TISSUE REMOVAL POSSIBLE GAS FLUID EXCHANGE VS SILICON OIL PLACEMENT WITH SCLERAL  BUCKEL PLACEMENT performed by Jarred Serrano MD at [facility]      Family History   Problem Relation Age of Onset    Atrial Fibrillation Mother     Heart Disease Father     Prostate Cancer Father     Cancer Father       Social History     Tobacco Use    Smoking status: Never    Smokeless tobacco: Never   Substance Use Topics    Alcohol use: Yes     Comment: occasional      No Known Allergies      ROS:  No obvious pertinent positives on review of systems except for what was outlined above.       Objective:       /60   Pulse (!) 48   Ht 1.702 m (5' 7\")   Wt 76.1 kg (167 lb 11.2 oz)   BMI 26.27 kg/m²     BP

## 2024-05-01 ENCOUNTER — OFFICE VISIT (OUTPATIENT)
Age: 64
End: 2024-05-01
Payer: COMMERCIAL

## 2024-05-01 VITALS
DIASTOLIC BLOOD PRESSURE: 60 MMHG | HEIGHT: 67 IN | HEART RATE: 48 BPM | SYSTOLIC BLOOD PRESSURE: 120 MMHG | WEIGHT: 167.7 LBS | BODY MASS INDEX: 26.32 KG/M2

## 2024-05-01 DIAGNOSIS — R00.1 SINUS BRADYCARDIA: ICD-10-CM

## 2024-05-01 DIAGNOSIS — E78.5 HYPERLIPIDEMIA, UNSPECIFIED HYPERLIPIDEMIA TYPE: ICD-10-CM

## 2024-05-01 DIAGNOSIS — R06.09 CHRONIC DYSPNEA: ICD-10-CM

## 2024-05-01 DIAGNOSIS — Z95.1 HX OF CABG: Primary | ICD-10-CM

## 2024-05-01 PROBLEM — I10 HYPERTENSION: Status: ACTIVE | Noted: 2022-05-13

## 2024-05-01 PROCEDURE — 1123F ACP DISCUSS/DSCN MKR DOCD: CPT | Performed by: INTERNAL MEDICINE

## 2024-05-01 PROCEDURE — 99214 OFFICE O/P EST MOD 30 MIN: CPT | Performed by: INTERNAL MEDICINE

## 2024-05-01 PROCEDURE — 3078F DIAST BP <80 MM HG: CPT | Performed by: INTERNAL MEDICINE

## 2024-05-01 PROCEDURE — 3074F SYST BP LT 130 MM HG: CPT | Performed by: INTERNAL MEDICINE

## 2024-05-01 RX ORDER — ASCORBIC ACID 500 MG
500 TABLET ORAL DAILY
COMMUNITY

## 2024-06-06 ENCOUNTER — TELEPHONE (OUTPATIENT)
Dept: PULMONOLOGY | Age: 64
End: 2024-06-06

## 2024-06-19 DIAGNOSIS — R06.09 CHRONIC DYSPNEA: Primary | ICD-10-CM

## 2024-07-01 DIAGNOSIS — E11.9 TYPE 2 DIABETES MELLITUS WITHOUT COMPLICATION, WITHOUT LONG-TERM CURRENT USE OF INSULIN (HCC): Primary | ICD-10-CM

## 2025-01-02 ENCOUNTER — PATIENT MESSAGE (OUTPATIENT)
Dept: INTERNAL MEDICINE CLINIC | Facility: CLINIC | Age: 65
End: 2025-01-02

## (undated) DEVICE — APPLICATOR FBR TIP 3IN M STK COT PLAS STRL

## (undated) DEVICE — 25+® REVOLUTION DSP ILM FORCEPS: Brand: ALCON GRIESHABER REVOLUTION 25+

## (undated) DEVICE — SOLUTION IRRIGATION BAL SALT SOLUTION 500 ML STRL BSS

## (undated) DEVICE — MICROSURGICAL INSTRUMENT 25GA SOFT TIP NEEDLE: Brand: ALCON

## (undated) DEVICE — NEEDLE SYR 18GA L1.5IN RED PLAS HUB S STL BLNT FILL W/O

## (undated) DEVICE — CONSTELLATION VISION SYSTEM 7500 CPM ULTRAVIT PROBE STRAIGHT ENDOILLUMINATOR 25+ TOTALPLUS VITRECTOMY PAK EDGEPLUS BLADE VALVED ENTRY SYSTEM: Brand: CONSTELLATION, ULTRAVIT, 25+, TOTALPLUS, EDGEPLUS

## (undated) DEVICE — RESERVOIR VITRETIN BACKFLUSH REPL ST DISP

## (undated) DEVICE — FORCEPS BX L240CM JAW DIA2.8MM L CAP W/ NDL MIC MESH TOOTH

## (undated) DEVICE — DIATHERMY PROBE DSP, 25G: Brand: ALCON GRIESHABER

## (undated) DEVICE — VGFI TUBING SET: Brand: VGFI

## (undated) DEVICE — SYRINGE MED 5ML STD CLR PLAS LUERLOCK TIP N CTRL DISP

## (undated) DEVICE — SYRINGE MED 10ML LUERLOCK TIP W/O SFTY DISP

## (undated) DEVICE — ADVANCED DSP BACKFLUSH SOFT TIP, 25G: Brand: ALCON GRIESHABER

## (undated) DEVICE — DISPOSABLE BIPOLAR CODE, 12' (3.66 M): Brand: CONMED

## (undated) DEVICE — NEEDLE HYPO 27GA L1.25IN GRY POLYPR HUB S STL REG BVL STR

## (undated) DEVICE — PACK VITRCTMY CASS 1500 PRB IV IRRIG ADMN LN UTIL LN 4MM INF

## (undated) DEVICE — CAUTERY ES 1100DEG LO TEMP ELONG FN TIP

## (undated) DEVICE — GARMENT,MEDLINE,DVT,INT,CALF,FOAM,MED: Brand: MEDLINE

## (undated) DEVICE — GLOVE SURG SZ 75 CRM LTX FREE POLYISOPRENE POLYMER BEAD ANTI

## (undated) DEVICE — CANNULA NSL ORAL AD FOR CAPNOFLEX CO2 O2 AIRLFE

## (undated) DEVICE — KENDALL RADIOLUCENT FOAM MONITORING ELECTRODE RECTANGULAR SHAPE: Brand: KENDALL

## (undated) DEVICE — CONNECTOR TBNG OD5-7MM O2 END DISP

## (undated) DEVICE — ENDOSCOPIC KIT 1.1+ OP4 CA DE 2 GWN AAMI LEVEL 3

## (undated) DEVICE — Device

## (undated) DEVICE — SUTURE MERSLEN 5-0 18IN RD-1 746G

## (undated) DEVICE — SUTURE PLN GUT SZ 6-0 L18IN ABSRB YELLOWISH TAN L6.5MM 1735G

## (undated) DEVICE — GLOVE SURG SZ 8 CRM LTX FREE POLYISOPRENE POLYMER BEAD ANTI

## (undated) DEVICE — SOLUTION IRRIGATION BAL SALT SOLUTION 500 ML BTL 6/CA BSS +

## (undated) DEVICE — SYRINGE MED 3ML CLR PLAS STD N CTRL LUERLOCK TIP DISP

## (undated) DEVICE — CONTAINER FORMALIN PREFILLED 10% NBF 60ML

## (undated) DEVICE — BETADINE 5% EYE SOL

## (undated) DEVICE — 1010 S-DRAPE TOWEL DRAPE 10/BX: Brand: STERI-DRAPE™

## (undated) DEVICE — SUTURE VCRL SZ 6-0 L18IN ABSRB VLT TG100-8 L6.5MM 1/4 CIR J544G

## (undated) DEVICE — 3M™ TEGADERM™ TRANSPARENT FILM DRESSING FRAME STYLE, 1628, 6 IN X 8 IN (15 CM X 20 CM), 10/CT 8CT/CASE: Brand: 3M™ TEGADERM™

## (undated) DEVICE — Device: Brand: 20G STRAIGHT ENDOPROBE® BOX OF 6

## (undated) DEVICE — ADMINISTRATION SET IV SINGLE FLUID 80 IN IRRIGATION KT

## (undated) DEVICE — SOLUTION IRRIG 1000ML STRL H2O USP PLAS POUR BTL

## (undated) DEVICE — SURGICAL PROCEDURE PACK EYE CDS